# Patient Record
Sex: FEMALE | Race: WHITE | NOT HISPANIC OR LATINO | Employment: UNEMPLOYED | URBAN - METROPOLITAN AREA
[De-identification: names, ages, dates, MRNs, and addresses within clinical notes are randomized per-mention and may not be internally consistent; named-entity substitution may affect disease eponyms.]

---

## 2019-09-12 LAB
HBA1C MFR BLD: 5.2 % (ref 0–5.7)
HIV 1&2 EXT: NORMAL
HPV ABSTRACT: NORMAL
PAP-ABSTRACT: NORMAL

## 2019-10-30 ENCOUNTER — TRANSFERRED RECORDS (OUTPATIENT)
Dept: MULTI SPECIALTY CLINIC | Facility: CLINIC | Age: 59
End: 2019-10-30

## 2019-10-30 LAB
CHOLEST SERPL-MCNC: 220 MG/DL (ref 0–199)
CREAT SERPL-MCNC: 0.49 MG/DL (ref 0.55–1.02)
GFR SERPL CREATININE-BSD FRML MDRD: >60 ML/MIN/1.73M2
GLUCOSE SERPL-MCNC: 115 MG/DL (ref 70–100)
HDLC SERPL-MCNC: 55 MG/DL
LDLC SERPL CALC-MCNC: 103 MG/DL
NONHDLC SERPL-MCNC: 165 MG/DL
POTASSIUM SERPL-SCNC: 3.9 MMOL/L (ref 3.5–5.1)
TRIGL SERPL-MCNC: 535 MG/DL

## 2020-01-09 ENCOUNTER — DOCUMENTATION ONLY (OUTPATIENT)
Dept: FAMILY MEDICINE | Facility: CLINIC | Age: 60
End: 2020-01-09

## 2020-01-09 DIAGNOSIS — Z00.00 ROUTINE HISTORY AND PHYSICAL EXAMINATION OF ADULT: Primary | ICD-10-CM

## 2020-01-09 DIAGNOSIS — Z11.59 NEED FOR HEPATITIS C SCREENING TEST: ICD-10-CM

## 2020-01-09 DIAGNOSIS — Z13.220 SCREENING FOR CHOLESTEROL LEVEL: ICD-10-CM

## 2020-01-09 DIAGNOSIS — Z13.1 SCREENING FOR DIABETES MELLITUS: ICD-10-CM

## 2020-01-09 NOTE — PROGRESS NOTES
Patient has an up coming lab appointment on 1.17.2020. Please review and place future orders that may be needed.     Thank you  Betty Hein MLT ([facility])

## 2020-01-17 ENCOUNTER — DOCUMENTATION ONLY (OUTPATIENT)
Dept: LAB | Facility: CLINIC | Age: 60
End: 2020-01-17

## 2020-01-17 DIAGNOSIS — Z11.59 NEED FOR HEPATITIS C SCREENING TEST: ICD-10-CM

## 2020-01-17 DIAGNOSIS — Z13.220 SCREENING FOR CHOLESTEROL LEVEL: ICD-10-CM

## 2020-01-17 DIAGNOSIS — Z13.1 SCREENING FOR DIABETES MELLITUS: ICD-10-CM

## 2020-01-17 DIAGNOSIS — Z00.00 ROUTINE HISTORY AND PHYSICAL EXAMINATION OF ADULT: ICD-10-CM

## 2020-01-17 LAB
ALBUMIN SERPL-MCNC: 4 G/DL (ref 3.4–5)
ALP SERPL-CCNC: 62 U/L (ref 40–150)
ALT SERPL W P-5'-P-CCNC: 29 U/L (ref 0–50)
ANION GAP SERPL CALCULATED.3IONS-SCNC: 5 MMOL/L (ref 3–14)
AST SERPL W P-5'-P-CCNC: 21 U/L (ref 0–45)
BILIRUB SERPL-MCNC: 0.4 MG/DL (ref 0.2–1.3)
BUN SERPL-MCNC: 18 MG/DL (ref 7–30)
CALCIUM SERPL-MCNC: 9.8 MG/DL (ref 8.5–10.1)
CHLORIDE SERPL-SCNC: 103 MMOL/L (ref 94–109)
CHOLEST SERPL-MCNC: 239 MG/DL
CO2 SERPL-SCNC: 29 MMOL/L (ref 20–32)
CREAT SERPL-MCNC: 0.54 MG/DL (ref 0.52–1.04)
GFR SERPL CREATININE-BSD FRML MDRD: >90 ML/MIN/{1.73_M2}
GLUCOSE SERPL-MCNC: 110 MG/DL (ref 70–99)
HDLC SERPL-MCNC: 69 MG/DL
LDLC SERPL CALC-MCNC: 122 MG/DL
NONHDLC SERPL-MCNC: 170 MG/DL
POTASSIUM SERPL-SCNC: 4.7 MMOL/L (ref 3.4–5.3)
PROT SERPL-MCNC: 7.7 G/DL (ref 6.8–8.8)
SODIUM SERPL-SCNC: 137 MMOL/L (ref 133–144)
TRIGL SERPL-MCNC: 238 MG/DL

## 2020-01-17 PROCEDURE — 80061 LIPID PANEL: CPT | Performed by: FAMILY MEDICINE

## 2020-01-17 PROCEDURE — 80053 COMPREHEN METABOLIC PANEL: CPT | Performed by: FAMILY MEDICINE

## 2020-01-17 PROCEDURE — 36415 COLL VENOUS BLD VENIPUNCTURE: CPT | Performed by: FAMILY MEDICINE

## 2020-01-17 NOTE — PROGRESS NOTES
...Your patient was in for lab test today and she has refused the Hep C testing due to having it done at a different clinic. Thank you   Leda   @ [facility]

## 2020-01-21 ENCOUNTER — TELEPHONE (OUTPATIENT)
Dept: FAMILY MEDICINE | Facility: CLINIC | Age: 60
End: 2020-01-21

## 2020-01-29 ENCOUNTER — MYC MEDICAL ADVICE (OUTPATIENT)
Dept: FAMILY MEDICINE | Facility: CLINIC | Age: 60
End: 2020-01-29

## 2020-01-29 DIAGNOSIS — I10 ESSENTIAL HYPERTENSION: Primary | ICD-10-CM

## 2020-01-29 RX ORDER — AMLODIPINE BESYLATE 5 MG/1
5 TABLET ORAL DAILY
Qty: 30 TABLET | Refills: 0 | Status: SHIPPED | OUTPATIENT
Start: 2020-01-29 | End: 2020-02-14

## 2020-01-29 NOTE — TELEPHONE ENCOUNTER
The requested prescription(s) has/have been approved and has/have been sent to the patient's pharmacy electronically. This note was forwarded to the patient care pool to contact the patient and let them know that this has been taken care of.   Yonatan Rachel MD

## 2020-01-29 NOTE — TELEPHONE ENCOUNTER
Patient is going to start coming to [place] now.   Has pending appointment with Dr. Yonatan Rachel 2/14/2020 and needs her amlodipine refilled.   Patient verified that she is taking amlodipine 5 mg daily and this RN can see this dose in CareEverywhere.    Routing to Dr. Yonatan Rachel to advise if ok to order medication.    Noemy Reyez BSN, RN

## 2020-02-14 ENCOUNTER — OFFICE VISIT (OUTPATIENT)
Dept: FAMILY MEDICINE | Facility: CLINIC | Age: 60
End: 2020-02-14
Payer: COMMERCIAL

## 2020-02-14 VITALS
DIASTOLIC BLOOD PRESSURE: 74 MMHG | HEIGHT: 64 IN | BODY MASS INDEX: 29.53 KG/M2 | HEART RATE: 88 BPM | OXYGEN SATURATION: 96 % | TEMPERATURE: 98.3 F | SYSTOLIC BLOOD PRESSURE: 126 MMHG | WEIGHT: 173 LBS | RESPIRATION RATE: 20 BRPM

## 2020-02-14 DIAGNOSIS — Z29.9 PROPHYLACTIC MEASURE: ICD-10-CM

## 2020-02-14 DIAGNOSIS — E78.00 HYPERCHOLESTEROLEMIA: ICD-10-CM

## 2020-02-14 DIAGNOSIS — Z12.4 SCREENING FOR MALIGNANT NEOPLASM OF CERVIX: ICD-10-CM

## 2020-02-14 DIAGNOSIS — K21.9 GASTROESOPHAGEAL REFLUX DISEASE, ESOPHAGITIS PRESENCE NOT SPECIFIED: ICD-10-CM

## 2020-02-14 DIAGNOSIS — R73.02 IGT (IMPAIRED GLUCOSE TOLERANCE): ICD-10-CM

## 2020-02-14 DIAGNOSIS — Z23 NEED FOR VACCINATION: ICD-10-CM

## 2020-02-14 DIAGNOSIS — Z00.00 ROUTINE GENERAL MEDICAL EXAMINATION AT A HEALTH CARE FACILITY: Primary | ICD-10-CM

## 2020-02-14 DIAGNOSIS — Z12.11 SPECIAL SCREENING FOR MALIGNANT NEOPLASMS, COLON: ICD-10-CM

## 2020-02-14 DIAGNOSIS — I10 ESSENTIAL HYPERTENSION: ICD-10-CM

## 2020-02-14 PROCEDURE — 90471 IMMUNIZATION ADMIN: CPT | Performed by: FAMILY MEDICINE

## 2020-02-14 PROCEDURE — 90632 HEPA VACCINE ADULT IM: CPT | Performed by: FAMILY MEDICINE

## 2020-02-14 PROCEDURE — 99386 PREV VISIT NEW AGE 40-64: CPT | Mod: 25 | Performed by: FAMILY MEDICINE

## 2020-02-14 PROCEDURE — 90472 IMMUNIZATION ADMIN EACH ADD: CPT | Performed by: FAMILY MEDICINE

## 2020-02-14 PROCEDURE — 90732 PPSV23 VACC 2 YRS+ SUBQ/IM: CPT | Performed by: FAMILY MEDICINE

## 2020-02-14 RX ORDER — LISINOPRIL 40 MG/1
40 TABLET ORAL DAILY
COMMUNITY
End: 2020-02-14

## 2020-02-14 RX ORDER — MULTIVITAMIN WITH IRON
1 TABLET ORAL DAILY
COMMUNITY

## 2020-02-14 RX ORDER — LISINOPRIL 40 MG/1
40 TABLET ORAL DAILY
Qty: 90 TABLET | Refills: 3 | Status: SHIPPED | OUTPATIENT
Start: 2020-02-14 | End: 2021-01-26

## 2020-02-14 RX ORDER — AMLODIPINE BESYLATE 5 MG/1
5 TABLET ORAL DAILY
Qty: 90 TABLET | Refills: 3 | Status: SHIPPED | OUTPATIENT
Start: 2020-02-14 | End: 2021-01-26

## 2020-02-14 RX ORDER — ATORVASTATIN CALCIUM 40 MG/1
40 TABLET, FILM COATED ORAL DAILY
COMMUNITY
End: 2020-02-14

## 2020-02-14 RX ORDER — GEMFIBROZIL 600 MG/1
600 TABLET, FILM COATED ORAL
Qty: 180 TABLET | Refills: 3 | Status: SHIPPED | OUTPATIENT
Start: 2020-02-14 | End: 2021-01-26

## 2020-02-14 RX ORDER — ESCITALOPRAM OXALATE 20 MG/1
20 TABLET ORAL DAILY
COMMUNITY
End: 2020-06-01

## 2020-02-14 RX ORDER — ATORVASTATIN CALCIUM 40 MG/1
40 TABLET, FILM COATED ORAL AT BEDTIME
Qty: 90 TABLET | Refills: 3 | Status: SHIPPED | OUTPATIENT
Start: 2020-02-14 | End: 2021-01-04

## 2020-02-14 RX ORDER — GEMFIBROZIL 600 MG/1
600 TABLET, FILM COATED ORAL
COMMUNITY
End: 2020-02-14

## 2020-02-14 ASSESSMENT — ENCOUNTER SYMPTOMS
NERVOUS/ANXIOUS: 0
FEVER: 0
HEMATURIA: 0
DIZZINESS: 0
COUGH: 0
FREQUENCY: 0
CHILLS: 0
CONSTIPATION: 0
HEMATOCHEZIA: 0
DIARRHEA: 1
ABDOMINAL PAIN: 0
EYE PAIN: 0

## 2020-02-14 ASSESSMENT — PAIN SCALES - GENERAL: PAINLEVEL: NO PAIN (0)

## 2020-02-14 ASSESSMENT — MIFFLIN-ST. JEOR: SCORE: 1331.78

## 2020-02-14 NOTE — Clinical Note
Please abstract the following data from this visit with this patient into the appropriate field in Epic:Tests that can be patient reported without a hard copy:{Quality Abstract List (Optional):[id number]}Other Tests found in the patient's chart through Chart Review/Care Everywhere:Mammogram done by [facility] this date: 9/13/2019Note to Abstraction: If this section is blank, no results were found via Chart Review/Care Everywhere.

## 2020-02-14 NOTE — PATIENT INSTRUCTIONS
Preventive Health Recommendations  Female Ages 50 - 64    Yearly exam: See your health care provider every year in order to  o Review health changes.   o Discuss preventive care.    o Review your medicines if your doctor has prescribed any.      Get a Pap test every three years (unless you have an abnormal result and your provider advises testing more often).    If you get Pap tests with HPV test, you only need to test every 5 years, unless you have an abnormal result.     You do not need a Pap test if your uterus was removed (hysterectomy) and you have not had cancer.    You should be tested each year for STDs (sexually transmitted diseases) if you're at risk.     Have a mammogram every 1 to 2 years.    Have a colonoscopy at age 50, or have a yearly FIT test (stool test). These exams screen for colon cancer.      Have a cholesterol test every 5 years, or more often if advised.    Have a diabetes test (fasting glucose) every three years. If you are at risk for diabetes, you should have this test more often.     If you are at risk for osteoporosis (brittle bone disease), think about having a bone density scan (DEXA).    Shots: Get a flu shot each year. Get a tetanus shot every 10 years.    Nutrition:     Eat at least 5 servings of fruits and vegetables each day.    Eat whole-grain bread, whole-wheat pasta and brown rice instead of white grains and rice.    Get adequate Calcium and Vitamin D.     Lifestyle    Exercise at least 150 minutes a week (30 minutes a day, 5 days a week). This will help you control your weight and prevent disease.    Limit alcohol to one drink per day.    No smoking.     Wear sunscreen to prevent skin cancer.     See your dentist every six months for an exam and cleaning.    See your eye doctor every 1 to 2 years.        Patient Education     Prediabetes  You have been diagnosed with prediabetes. This means that the level of sugar (glucose) in your blood is too high. If you have prediabetes,  you are at risk for developing type 2 diabetes. Type 2 diabetes is diagnosed when the level of glucose in the blood reaches a certain high level. With prediabetes, it hasn t reached this point yet, but it is higher than normal. It is vital to make lifestyle changes to lower your blood sugar, improve your health, and prevent diabetes. This sheet will tell you more.      Why worry about prediabetes?  Prediabetes is a conditionhere the body s cells have trouble using glucose in the blood for energy. As a result, too much glucose stays in the blood and can affect how your heart and blood vessels work. Without changes in diet and lifestyle, the problem can get worse. Once you have type 2 diabetes, it is chronic (ongoing) and needs to be managed for the rest of your life. Diabetes can harm the body and your health by damaging organs, such as your eyes and kidneys. It makes you more likely to have heart disease. And it can damage nerves and blood vessels.  Who is a risk for prediabetes?  The exact cause of prediabetes is not clear. But certain risk factors make a person more likely to have it. These include:    A family history of type 2 diabetes    Being overweight    Being over age 45    Have hypertension or elevated cholesterol     Having had gestational diabetes    Not being physically active    Being ,  American, , , , or   Diagnosing prediabetes  Prediabetes may have no symptoms or you may have some of the symptoms of diabetes. The diagnosis is made with a blood test. You may have one or more of these blood tests:     Fasting glucose test. Blood is taken and tested after you have fasted (not eaten) for at least 8 hours. A normal test result is 99 milligrams per deciliter (mg/dL) or lower. Prediabetes is 100 mg/dL to 125 mg/dL. Diabetes is 126 mg/dL or higher.    Glucose tolerance test. Your blood sugar is measured before and after you drink a  very sugary liquid. A normal test result is 139 milligrams per deciliter (mg/dL) or lower. Prediabetes is 140 mg/dL to 199 mg/dL. Diabetes is 200 mg/dL or higher.    Hemoglobin A1c (HbA1c). Your HbA1c is normal if it is below 5.7%. Prediabetes is 5.7% to 6.4%. Diabetes is 6.5% or higher.   Treating prediabetes  The best way to treat prediabetes is to lose at least 5% to 7% of your current weight and be more physically active by getting at least 150 minutes a week of physical activity (at least 30 minutes daily.) When sitting for long periods of time, get up for short sessions of light activity every 30 minutes. These changes help the body s cells use blood sugar better. Even a small amount of weight loss can help. Work with your healthcare provider to make a plan to eat well and be more active. Keep in mind that small changes can add up. Other changes in your lifestyle (or even taking certain medicines, such as metformin) may make you less likely to develop diabetes. Your healthcare provider can talk with you about these. Stopping smoking will decrease your risk of developing diabetes, but you may gain some weight if you are not careful.  Follow-up  If it is untreated, prediabetes can turn into diabetes. This is a serious health condition. Take steps to stop this from happening. Follow the treatment plan you have been given. You may have your blood glucose tested again in about 12 to 18 months.  Symptoms of diabetes  Let your healthcare provider know if you have any of the following:    Always feel very tired    Feel very thirsty or hungry much of the time    Have to urinate often    Lose weight for no reason    Feel numbness or tingling in your fingers or toes    Have cuts or bruises that don t seem to heal    Have blurry vision  Date Last Reviewed: 5/1/2016 2000-2019 [employer]. [address] [facility], [place] [address]. All rights reserved. This information is not intended as a substitute for  professional medical care. Always follow your healthcare professional's instructions.

## 2020-02-14 NOTE — Clinical Note
Please abstract the following data from this visit with this patient into the appropriate field in Epic:Tests that can be patient reported without a hard copy:{Quality Abstract List (Optional):[id number]}Other Tests found in the patient's chart through Chart Review/Care Everywhere:[facility] this date: 9/12/2019 and Lipids done by [facility] on this date: 10/30/2019Note to Abstraction: If this section is blank, no results were found via Chart Review/Care Everywhere.

## 2020-02-14 NOTE — NURSING NOTE
"Chief Complaint   Patient presents with     Physical     Health Maintenance     order pended, PHQ2 Adv Dir, Mammo abstracted 9/13/2019 completed       Initial /79   Pulse 88   Temp 98.3  F (36.8  C) (Oral)   Resp 20   Ht 5' 3.5\" (1.613 m)   Wt 173 lb (78.5 kg)   SpO2 96%   BMI 30.16 kg/m   Estimated body mass index is 30.16 kg/m  as calculated from the following:    Height as of this encounter: 5' 3.5\" (1.613 m).    Weight as of this encounter: 173 lb (78.5 kg).  Medication Reconciliation: complete  Evie Rodriguez, GUILLE  "

## 2020-02-14 NOTE — PROGRESS NOTES
SUBJECTIVE:   CC: Leeann Johnson is an 59 year old woman who presents for preventive health visit.     Healthy Habits:     Getting at least 3 servings of Calcium per day:  Yes    Bi-annual eye exam:  NO    Dental care twice a year:  Yes    Sleep apnea or symptoms of sleep apnea:  None    Diet:  Regular (no restrictions)    Frequency of exercise:  None    Taking medications regularly:  Yes    Medication side effects:  None    PHQ-2 Total Score: 0    Additional concerns today:  No              Today's PHQ-2 Score:   PHQ-2 (  [employer]) 2020   Q1: Little interest or pleasure in doing things 0   Q2: Feeling down, depressed or hopeless 0   PHQ-2 Score 0   Q1: Little interest or pleasure in doing things Not at all   Q2: Feeling down, depressed or hopeless Not at all   PHQ-2 Score 0       Abuse: Current or Past(Physical, Sexual or Emotional)- No  Do you feel safe in your environment? Yes    Have you ever done Advance Care Planning? (For example, a Health Directive, POLST, or a discussion with a medical provider or your loved ones about your wishes): Yes, patient states has an Advance Care Planning document and will bring a copy to the clinic.    Social History     Tobacco Use     Smoking status: Former Smoker     Packs/day: 1.00     Years: 30.00     Pack years: 30.00     Types: Cigarettes     Start date: 5/3/1974     Last attempt to quit: 5/3/2005     Years since quittin.7     Smokeless tobacco: Never Used   Substance Use Topics     Alcohol use: Yes         Alcohol Use 2020   Prescreen: >3 drinks/day or >7 drinks/week? No       Reviewed orders with patient.  Reviewed health maintenance and updated orders accordingly -           Pertinent mammograms are reviewed under the imaging tab.  History of abnormal Pap smear: NO - age 30- 65 PAP every 3 years recommended     Reviewed and updated as needed this visit by clinical staff  Tobacco  Allergies  Meds  Med Hx  Surg Hx  Fam Hx  Soc Hx        Reviewed  "and updated as needed this visit by Provider  Tobacco            Review of Systems   Constitutional: Negative for chills and fever.   HENT: Negative for congestion and ear pain.    Eyes: Negative for pain.   Respiratory: Negative for cough.    Cardiovascular: Negative for chest pain.   Gastrointestinal: Positive for diarrhea. Negative for abdominal pain, constipation and hematochezia.   Genitourinary: Negative for frequency and hematuria.   Neurological: Negative for dizziness.   Psychiatric/Behavioral: The patient is not nervous/anxious.           OBJECTIVE:   /74   Pulse 88   Temp 98.3  F (36.8  C) (Oral)   Resp 20   Ht 5' 3.5\" (1.613 m)   Wt 173 lb (78.5 kg)   SpO2 96%   BMI 30.16 kg/m    Physical Exam      Diagnostic Test Results:  Labs reviewed in Epic    ASSESSMENT/PLAN:       ICD-10-CM    1. Routine general medical examination at a health care facility Z00.00    2. Screening for malignant neoplasm of cervix Z12.4    3. Special screening for malignant neoplasms, colon Z12.11 GASTROENTEROLOGY ADULT REF PROCEDURE ONLY   4. Prophylactic measure Z29.9 aspirin (ASA) 81 MG tablet   5. Essential hypertension I10 amLODIPine (NORVASC) 5 MG tablet     lisinopril (ZESTRIL) 40 MG tablet   6. Hypercholesterolemia E78.00 atorvastatin (LIPITOR) 40 MG tablet     gemfibrozil (LOPID) 600 MG tablet   7. Gastroesophageal reflux disease, esophagitis presence not specified K21.9 omeprazole (PRILOSEC) 20 MG DR capsule   8. IGT (impaired glucose tolerance) R73.02    9. Need for vaccination Z23        COUNSELING:  Reviewed preventive health counseling, as reflected in patient instructions       Regular exercise       Healthy diet/nutrition       Vision screening       Aspirin Prophylaxsis       Osteoporosis Prevention/Bone Health       Colon cancer screening       One time pneumovax for smokers    Estimated body mass index is 30.16 kg/m  as calculated from the following:    Height as of this encounter: 5' 3.5\" (1.613 " m).    Weight as of this encounter: 173 lb (78.5 kg).    Weight management plan: Discussed healthy diet and exercise guidelines     reports that she quit smoking about 14 years ago. Her smoking use included cigarettes. She started smoking about 45 years ago. She has a 30.00 pack-year smoking history. She has never used smokeless tobacco.      Counseling Resources:  ATP IV Guidelines  Pooled Cohorts Equation Calculator  Breast Cancer Risk Calculator  FRAX Risk Assessment  ICSI Preventive Guidelines  Dietary Guidelines for Americans, 2010  USDA's MyPlate  ASA Prophylaxis  Lung CA Screening    Yonatan Rachel MD  [facility]  --------------------------------------------------------------------------------------------------------------------------------------  SUBJECTIVE:  Leeann Johnson is a 59 year old female who presents to the clinic today for a routine physical exam.    The patient's last physical was in January 2019 at health [facility] in [place].      Cholesterol   Date Value Ref Range Status   01/17/2020 239 (H) <200 mg/dL Final     Comment:     Desirable:       <200 mg/dl     HDL Cholesterol   Date Value Ref Range Status   01/17/2020 69 >49 mg/dL Final     LDL Cholesterol Calculated   Date Value Ref Range Status   01/17/2020 122 (H) <100 mg/dL Final     Comment:     Above desirable:  100-129 mg/dl  Borderline High:  130-159 mg/dL  High:             160-189 mg/dL  Very high:       >189 mg/dl       Triglycerides   Date Value Ref Range Status   01/17/2020 238 (H) <150 mg/dL Final     Comment:     Borderline high:  150-199 mg/dl  High:             200-499 mg/dl  Very high:       >499 mg/dl  Fasting specimen       No results found for: CHOLHDLRATIO  The patient's last fasting lipid panel was done 3 weeks ago and the results are listed above.    The 10-year ASCVD risk score (Otoniel DC Jr., et al., 2013) is: 4.2%    Values used to calculate the score:      Age: 59 years      Sex: Female      Is  Non- : No      Diabetic: No      Tobacco smoker: No      Systolic Blood Pressure: 126 mmHg      Is BP treated: Yes      HDL Cholesterol: 69 mg/dL      Total Cholesterol: 239 mg/dL    Risk Enhancers:  Family history of premature ASCVD  LDL >159  Chronic kidney disease   Metabolic Syndrome  Premature menopause  Inflammatory conditions (RA, psoriasis, HIV)  SE  Ancestry  Triglycerides >174        The patient reports that she has been treated for high blood pressure.    The patient reports that she does not take a daily aspirin.        No results found for: HCVAB  The patient reports that she has been screened for Hepatitis C    (Screen all baby boomers once per CDC-- the generation born from 1945 through 1965 and per USPTF screen age 19 to 79 especially younger people who have used IV drugs)  She would not like to have an Hepatitis C test today    No results found for: HIAGAB  The patient reports that she has been screened for HIV   (Screen all 15 to 64 years old)  She would not like to have an HIV test today              Immunization History   Administered Date(s) Administered     Influenza (IIV3) PF 11/13/2013, 11/12/2014     Influenza Vaccine IM > 6 months Valent IIV4 10/06/2016, 11/16/2017, 09/12/2019     Influenza Vaccine Im 4yrs+ 4 Valent CCIIV4 10/02/2018     Influenza Vaccine, 6+MO IM (QUADRIVALENT W/PRESERVATIVES) 11/11/2015     TDAP Vaccine (Adacel) 12/19/2014     Td (Adult), Adsorbed 11/09/2004     Zoster vaccine, live 10/17/2012     The patient has not started the Gardasil vaccination series.  The patient's believes that her last tetanus shot was given 6 year(s) ago.   The patient believes that she has not had a Shingrix in the past  The patient believes that she has not had a PPSV23 in the past.  The patient believes that she has not had a PCV13 in the past.  The patient believes that she has had a seasonal flu vaccination this fall or winter.  The patient would like to have a  PPSV23       No results found for this or any previous visit.]   The patient denies a family history of colon cancer.  The patient reports that she has had a colonoscopy. Her  last colonoscopy was in 2010 and she  report that is was normal. The patient was told to have this repeated in 10 years.    The patient reports a family history diabetes in her mother.    The patient reports that she does performs a self breast exam occasionally.  The patient denies a family history of breast cancer.  The patient does not want to have a mammogram done. She had one 9-  The patient does not want to have a Pap smear done as she just had one done in September 2019  She reports that she has not had an abnormal pap smear.    She had her last period about age 49.  The patient is not interested in hormone replacement therapy.  The patient reports that she eats or drinks 3 servings of dairy products per day. She does not take a calcium supplement at all..  The patient reports that she has not had a bone density scan.     (screen women 65+ or 50+ with risk factors)     The patient reports that she has dental appointments approximately every 6 months.  The patient reports that she  has an eye examination approximately every 2 year(s).        Do you currently smoke? No, quit 15 years ago  How many years have you smoked? 30  How many packs per day did you smoke on average? 1 ppd  (if more than 30 pack year history and the patient is age 55-80 consider ordering an annual low dose radiation lung CT to screen for cancer)  (Do not order if patient has quit more than 15 years ago or has a health condition that limits life expectancy or could not tolerate curative lung surgery)  Are you interested having a lung CT to screen for lung cancer? N/A            Past Medical History:   Diagnosis Date     Hypertension 2018       Past Surgical History:   Procedure Laterality Date     COLONOSCOPY  4-2010     GYN SURGERY  5-1997    Tubular        Family History   Problem Relation Age of Onset     Diabetes Mother      Hypertension Mother      Hyperlipidemia Mother      Depression Mother      Coronary Artery Disease Father      Hypertension Father      Hyperlipidemia Father      Coronary Artery Disease Maternal Grandmother      Coronary Artery Disease Maternal Grandfather        Social History     Socioeconomic History     Marital status:      Spouse name: Not on file     Number of children: Not on file     Years of education: Not on file     Highest education level: Not on file   Occupational History     Not on file   Social Needs     Financial resource strain: Not on file     Food insecurity:     Worry: Not on file     Inability: Not on file     Transportation needs:     Medical: Not on file     Non-medical: Not on file   Tobacco Use     Smoking status: Former Smoker     Packs/day: 1.00     Years: 30.00     Pack years: 30.00     Types: Cigarettes     Start date: 5/3/1974     Last attempt to quit: 5/3/2005     Years since quittin.7     Smokeless tobacco: Never Used   Substance and Sexual Activity     Alcohol use: Yes     Drug use: Not Currently     Types: Cocaine, Marijuana, Methamphetamines     Sexual activity: Not Currently     Partners: Male     Birth control/protection: Post-menopausal   Lifestyle     Physical activity:     Days per week: Not on file     Minutes per session: Not on file     Stress: Not on file   Relationships     Social connections:     Talks on phone: Not on file     Gets together: Not on file     Attends [employer] service: Not on file     Active member of club or organization: Not on file     Attends meetings of clubs or organizations: Not on file     Relationship status: Not on file     Intimate partner violence:     Fear of current or ex partner: Not on file     Emotionally abused: Not on file     Physically abused: Not on file     Forced sexual activity: Not on file   Other Topics Concern     Parent/sibling w/ CABG,  "MI or angioplasty before 65F 55M? Yes     Comment: Father bypass   Social History Narrative     Not on file       Current Outpatient Medications   Medication Sig Dispense Refill     amLODIPine (NORVASC) 5 MG tablet Take 1 tablet (5 mg) by mouth daily 30 tablet 0     atorvastatin (LIPITOR) 40 MG tablet Take 40 mg by mouth daily       escitalopram (LEXAPRO) 20 MG tablet Take 20 mg by mouth daily       gemfibrozil (LOPID) 600 MG tablet Take 600 mg by mouth 2 times daily (before meals)       lisinopril (ZESTRIL) 40 MG tablet Take 40 mg by mouth daily       magnesium 250 MG tablet Take 1 tablet by mouth daily       Omega-3 Fatty Acids (FISH OIL OMEGA-3 PO)        omeprazole (PRILOSEC) 20 MG DR capsule Take 20 mg by mouth daily       vitamin B-Complex Take 1 tablet by mouth daily           PHYSICAL EXAMINATION:  Blood pressure 126/74, pulse 88, temperature 98.3  F (36.8  C), temperature source Oral, resp. rate 20, height 5' 3.5\" (1.613 m), weight 173 lb (78.5 kg), SpO2 96 %.  General appearance - healthy, alert and no distress  Skin - Skin color, texture, turgor normal. No rashes or lesions.  Head - Normocephalic. No masses, lesions, tenderness or abnormalities  Eyes - conjunctivae/corneas clear. PERRL, EOM's intact. Fundi benign  Ears - External ears normal. Canals clear. TM's normal.  Nose/Sinuses - Nares normal. Septum midline. Mucosa normal. No drainage or sinus tenderness.  Oropharynx - Lips, mucosa, and tongue normal. Teeth and gums normal.  Neck - Neck supple. No adenopathy. Thyroid symmetric, normal size,  Lungs - Percussion normal. Good diaphragmatic excursion. Lungs clear  Heart - PMI normal. No lifts, heaves, or thrills. RRR. No murmurs, clicks gallops or rub  Breasts - Breasts normal to inspection and palpation. Axillae negative  Abdomen - Abdomen soft, non-tender. BS normal. No masses, organomegaly  Extremities - Extremities normal. No deformities, edema, or skin discoloration.  Musculoskeletal - Spine ROM " normal. Muscular strength intact.  Peripheral pulses - radial=4/4, femoral=4/4, popliteal=4/4, dorsalis pedis=4/4,  Neuro - Gait normal. Reflexes normal and symmetric. Sensation grossly WNL.        Orders Only on 01/17/2020   Component Date Value Ref Range Status     Sodium 01/17/2020 137  133 - 144 mmol/L Final     Potassium 01/17/2020 4.7  3.4 - 5.3 mmol/L Final     Chloride 01/17/2020 103  94 - 109 mmol/L Final     Carbon Dioxide 01/17/2020 29  20 - 32 mmol/L Final     Anion Gap 01/17/2020 5  3 - 14 mmol/L Final     Glucose 01/17/2020 110* 70 - 99 mg/dL Final    Fasting specimen     Urea Nitrogen 01/17/2020 18  7 - 30 mg/dL Final     Creatinine 01/17/2020 0.54  0.52 - 1.04 mg/dL Final     GFR Estimate 01/17/2020 >90  >60 mL/min/[1.73_m2] Final    Comment: Non  GFR Calc  Starting 12/18/2018, serum creatinine based estimated GFR (eGFR) will be   calculated using the Chronic Kidney Disease Epidemiology Collaboration   (CKD-EPI) equation.       GFR Estimate If Black 01/17/2020 >90  >60 mL/min/[1.73_m2] Final    Comment:  GFR Calc  Starting 12/18/2018, serum creatinine based estimated GFR (eGFR) will be   calculated using the Chronic Kidney Disease Epidemiology Collaboration   (CKD-EPI) equation.       Calcium 01/17/2020 9.8  8.5 - 10.1 mg/dL Final     Bilirubin Total 01/17/2020 0.4  0.2 - 1.3 mg/dL Final     Albumin 01/17/2020 4.0  3.4 - 5.0 g/dL Final     Protein Total 01/17/2020 7.7  6.8 - 8.8 g/dL Final     Alkaline Phosphatase 01/17/2020 62  40 - 150 U/L Final     ALT 01/17/2020 29  0 - 50 U/L Final     AST 01/17/2020 21  0 - 45 U/L Final     Cholesterol 01/17/2020 239* <200 mg/dL Final    Desirable:       <200 mg/dl     Triglycerides 01/17/2020 238* <150 mg/dL Final    Comment: Borderline high:  150-199 mg/dl  High:             200-499 mg/dl  Very high:       >499 mg/dl  Fasting specimen       HDL Cholesterol 01/17/2020 69  >49 mg/dL Final     LDL Cholesterol Calculated 01/17/2020  122* <100 mg/dL Final    Comment: Above desirable:  100-129 mg/dl  Borderline High:  130-159 mg/dL  High:             160-189 mg/dL  Very high:       >189 mg/dl       Non HDL Cholesterol 01/17/2020 170* <130 mg/dL Final    Comment: Above Desirable:  130-159 mg/dl  Borderline high:  160-189 mg/dl  High:             190-219 mg/dl  Very high:       >219 mg/dl         ASSESSMENT:    ICD-10-CM    1. Routine general medical examination at a health care facility Z00.00    2. Screening for malignant neoplasm of cervix Z12.4        Well-Adult Physical Exam.  Health Maintenance Due   Topic Date Due     PREVENTIVE CARE VISIT  1960     HEPATITIS C SCREENING  1960     ADVANCE CARE PLANNING  1960     MAMMO SCREENING  1960     COLONOSCOPY  08/12/1969     HIV SCREENING  08/12/1974     PAP  08/12/1980     ZOSTER IMMUNIZATION (2 of 3) 12/12/2012     PHQ-2  01/01/2020     Health Maintenance   Topic Date Due     PREVENTIVE CARE VISIT  1960     HEPATITIS C SCREENING  1960     ADVANCE CARE PLANNING  1960     MAMMO SCREENING  1960     COLONOSCOPY  08/12/1969     HIV SCREENING  08/12/1974     PAP  08/12/1980     ZOSTER IMMUNIZATION (2 of 3) 12/12/2012     PHQ-2  01/01/2020     DTAP/TDAP/TD IMMUNIZATION (2 - Td) 12/19/2024     LIPID  01/17/2025     INFLUENZA VACCINE  Completed     IPV IMMUNIZATION  Aged Out     MENINGITIS IMMUNIZATION  Aged Out         HEALTH CARE MAINTENENCE: The recommended screening tests and vaccinatons for this patient have been discussed as above.  The appropriate tests and vaccinations  have been ordered or declined by the patient. Please see the orders in EPIC.The patient specifically declines: Shingrix today but she will come to the pharmacy after her [place] trip    Immunization Status:  up to date and documented except for Hep A and Shingrix and PPSV23    Patient Active Problem List   Diagnosis     IGT (impaired glucose tolerance)     Essential hypertension      Hypercholesterolemia     Gastroesophageal reflux disease, esophagitis presence not specified        ATP III Guidelines  ICSI Preventive Guidelines    PLAN:  Risk of gemfibrozil and statin discussed. She has been on these for many years without problems and she specifically denies muscle aches  I recommended starting to take a daily aspirin ( mg)  HIV testing was discussed but the pt declined  Shingrix recommended  PPSV23 recommended  Hep A vaccine recommended  Colonoscopy recommended  Breast self exam demonstrated and recommended  Pap smear was not recommended per the ACOG guidelines  Discussed calcium intake, vitamins and supplements. Recommended 1200 mg of calcium daily  Bone density scan (DEXA) recommended in 4 years   Weight loss through diet and exercise was recommended  Sunscreen use was recommended especially in the area of tatoos  Refills on chronic medication given  Recommended dental exams every 6 months  Recommended eye exam every 1-2 years  Follow up in 1 year for the next preventative medical visit      Osteoporosis TX indications:  Post menopausal women with a hip or vertebral fracture  Any T score less than or equal to -2.5  Any T score between -1.0 and -2.5 and a 10 year hip fracture probability > or = to 3%  Any T score between -1.0 and -2.5 and a 10 year probability or a major osteoporosis-related fracture  > or = 20% based on FRAX score  [email or web address]            The patients chronic medication was filled for 12 months.    Body mass index is 30.16 kg/m .

## 2020-02-14 NOTE — NURSING NOTE
Prior to immunization administration, verified patients identity using patient s name and date of birth. Please see Immunization Activity for additional information.     Screening Questionnaire for Adult Immunization    Are you sick today?   No   Do you have allergies to medications, food, a vaccine component or latex?   No   Have you ever had a serious reaction after receiving a vaccination?   No   Do you have a long-term health problem with heart, lung, kidney, or metabolic disease (e.g., diabetes), asthma, a blood disorder, no spleen, complement component deficiency, a cochlear implant, or a spinal fluid leak?  Are you on long-term aspirin therapy?   No   Do you have cancer, leukemia, HIV/AIDS, or any other immune system problem?   No   Do you have a parent, brother, or sister with an immune system problem?   No   In the past 3 months, have you taken medications that affect  your immune system, such as prednisone, other steroids, or anticancer drugs; drugs for the treatment of rheumatoid arthritis, Crohn s disease, or psoriasis; or have you had radiation treatments?   No   Have you had a seizure, or a brain or other nervous system problem?   No   During the past year, have you received a transfusion of blood or blood    products, or been given immune (gamma) globulin or antiviral drug?   No   For women: Are you pregnant or is there a chance you could become       pregnant during the next month?   No   Have you received any vaccinations in the past 4 weeks?   No     Immunization questionnaire answers were all negative.        Per orders of Dr. Rachel, injection of Pneumo 23, Hep A given by Evie Rodriguez CMA. Patient instructed to remain in clinic for 15 minutes afterwards, and to report any adverse reaction to me immediately.       Screening performed by Evie Rodriguez CMA on 2/14/2020 at 3:07 PM.

## 2020-02-16 ENCOUNTER — HEALTH MAINTENANCE LETTER (OUTPATIENT)
Age: 60
End: 2020-02-16

## 2020-02-17 ASSESSMENT — ANXIETY QUESTIONNAIRES
3. WORRYING TOO MUCH ABOUT DIFFERENT THINGS: NOT AT ALL
6. BECOMING EASILY ANNOYED OR IRRITABLE: NOT AT ALL
IF YOU CHECKED OFF ANY PROBLEMS ON THIS QUESTIONNAIRE, HOW DIFFICULT HAVE THESE PROBLEMS MADE IT FOR YOU TO DO YOUR WORK, TAKE CARE OF THINGS AT HOME, OR GET ALONG WITH OTHER PEOPLE: NOT DIFFICULT AT ALL
7. FEELING AFRAID AS IF SOMETHING AWFUL MIGHT HAPPEN: NOT AT ALL
5. BEING SO RESTLESS THAT IT IS HARD TO SIT STILL: NOT AT ALL
GAD7 TOTAL SCORE: 0
1. FEELING NERVOUS, ANXIOUS, OR ON EDGE: NOT AT ALL
2. NOT BEING ABLE TO STOP OR CONTROL WORRYING: NOT AT ALL

## 2020-02-17 ASSESSMENT — PATIENT HEALTH QUESTIONNAIRE - PHQ9
SUM OF ALL RESPONSES TO PHQ QUESTIONS 1-9: 0
5. POOR APPETITE OR OVEREATING: NOT AT ALL

## 2020-02-18 ASSESSMENT — ANXIETY QUESTIONNAIRES: GAD7 TOTAL SCORE: 0

## 2020-02-21 ENCOUNTER — HOSPITAL ENCOUNTER (OUTPATIENT)
Facility: AMBULATORY SURGERY CENTER | Age: 60
End: 2020-02-21
Attending: SURGERY | Admitting: SURGERY
Payer: COMMERCIAL

## 2020-06-01 ENCOUNTER — MYC MEDICAL ADVICE (OUTPATIENT)
Dept: FAMILY MEDICINE | Facility: CLINIC | Age: 60
End: 2020-06-01

## 2020-06-01 DIAGNOSIS — F41.9 ANXIETY: Primary | ICD-10-CM

## 2020-06-01 RX ORDER — ESCITALOPRAM OXALATE 20 MG/1
20 TABLET ORAL DAILY
Qty: 90 TABLET | Refills: 1 | Status: SHIPPED | OUTPATIENT
Start: 2020-06-01 | End: 2020-12-28

## 2020-06-01 NOTE — TELEPHONE ENCOUNTER
Patient has been taking this from her old clinic/ provider.   RN pended.     Routing to provider to advise.  Noemy Reyez BSN, RN

## 2020-06-23 DIAGNOSIS — Z11.59 ENCOUNTER FOR SCREENING FOR OTHER VIRAL DISEASES: Primary | ICD-10-CM

## 2020-07-14 RX ORDER — SODIUM, POTASSIUM,MAG SULFATES 17.5-3.13G
1 SOLUTION, RECONSTITUTED, ORAL ORAL SEE ADMIN INSTRUCTIONS
Qty: 2 BOTTLE | Refills: 0 | Status: SHIPPED | OUTPATIENT
Start: 2020-07-14 | End: 2022-05-09

## 2020-07-14 RX ORDER — BISACODYL 5 MG
5 TABLET, DELAYED RELEASE (ENTERIC COATED) ORAL SEE ADMIN INSTRUCTIONS
Qty: 1 TABLET | Refills: 0 | Status: SHIPPED | OUTPATIENT
Start: 2020-07-14 | End: 2022-05-09

## 2020-07-18 DIAGNOSIS — Z11.59 ENCOUNTER FOR SCREENING FOR OTHER VIRAL DISEASES: ICD-10-CM

## 2020-07-18 PROCEDURE — U0003 INFECTIOUS AGENT DETECTION BY NUCLEIC ACID (DNA OR RNA); SEVERE ACUTE RESPIRATORY SYNDROME CORONAVIRUS 2 (SARS-COV-2) (CORONAVIRUS DISEASE [COVID-19]), AMPLIFIED PROBE TECHNIQUE, MAKING USE OF HIGH THROUGHPUT TECHNOLOGIES AS DESCRIBED BY CMS-2020-01-R: HCPCS | Performed by: SURGERY

## 2020-07-19 LAB
SARS-COV-2 RNA SPEC QL NAA+PROBE: NOT DETECTED
SPECIMEN SOURCE: NORMAL

## 2020-07-21 ENCOUNTER — HOSPITAL ENCOUNTER (OUTPATIENT)
Facility: AMBULATORY SURGERY CENTER | Age: 60
Discharge: HOME | End: 2020-07-21
Attending: SURGERY | Admitting: SURGERY
Payer: COMMERCIAL

## 2020-07-21 VITALS
DIASTOLIC BLOOD PRESSURE: 63 MMHG | HEART RATE: 87 BPM | OXYGEN SATURATION: 93 % | SYSTOLIC BLOOD PRESSURE: 110 MMHG | TEMPERATURE: 97.4 F | RESPIRATION RATE: 16 BRPM

## 2020-07-21 DIAGNOSIS — Z12.11 SCREEN FOR COLON CANCER: Primary | ICD-10-CM

## 2020-07-21 LAB — COLONOSCOPY: NORMAL

## 2020-07-21 PROCEDURE — 88305 TISSUE EXAM BY PATHOLOGIST: CPT | Performed by: SURGERY

## 2020-07-21 PROCEDURE — 45381 COLONOSCOPY SUBMUCOUS NJX: CPT

## 2020-07-21 PROCEDURE — G8907 PT DOC NO EVENTS ON DISCHARG: HCPCS

## 2020-07-21 PROCEDURE — 45385 COLONOSCOPY W/LESION REMOVAL: CPT | Mod: PT | Performed by: SURGERY

## 2020-07-21 PROCEDURE — 45381 COLONOSCOPY SUBMUCOUS NJX: CPT | Mod: PT | Performed by: SURGERY

## 2020-07-21 PROCEDURE — 45385 COLONOSCOPY W/LESION REMOVAL: CPT

## 2020-07-21 PROCEDURE — 99152 MOD SED SAME PHYS/QHP 5/>YRS: CPT | Mod: 59 | Performed by: SURGERY

## 2020-07-21 PROCEDURE — G8918 PT W/O PREOP ORDER IV AB PRO: HCPCS

## 2020-07-21 RX ORDER — ONDANSETRON 2 MG/ML
4 INJECTION INTRAMUSCULAR; INTRAVENOUS EVERY 6 HOURS PRN
Status: DISCONTINUED | OUTPATIENT
Start: 2020-07-21 | End: 2020-07-22 | Stop reason: HOSPADM

## 2020-07-21 RX ORDER — FENTANYL CITRATE 50 UG/ML
INJECTION, SOLUTION INTRAMUSCULAR; INTRAVENOUS PRN
Status: DISCONTINUED | OUTPATIENT
Start: 2020-07-21 | End: 2020-07-21 | Stop reason: HOSPADM

## 2020-07-21 RX ORDER — METOCLOPRAMIDE HYDROCHLORIDE 5 MG/ML
10 INJECTION INTRAMUSCULAR; INTRAVENOUS EVERY 6 HOURS PRN
Status: DISCONTINUED | OUTPATIENT
Start: 2020-07-21 | End: 2020-07-22 | Stop reason: HOSPADM

## 2020-07-21 RX ORDER — NALOXONE HYDROCHLORIDE 0.4 MG/ML
.1-.4 INJECTION, SOLUTION INTRAMUSCULAR; INTRAVENOUS; SUBCUTANEOUS
Status: DISCONTINUED | OUTPATIENT
Start: 2020-07-21 | End: 2020-07-22 | Stop reason: HOSPADM

## 2020-07-21 RX ORDER — FLUMAZENIL 0.1 MG/ML
0.2 INJECTION, SOLUTION INTRAVENOUS
Status: SHIPPED | OUTPATIENT
Start: 2020-07-21 | End: 2020-07-21

## 2020-07-21 RX ORDER — METOCLOPRAMIDE 10 MG/1
10 TABLET ORAL EVERY 6 HOURS PRN
Status: DISCONTINUED | OUTPATIENT
Start: 2020-07-21 | End: 2020-07-22 | Stop reason: HOSPADM

## 2020-07-21 RX ORDER — PROCHLORPERAZINE MALEATE 10 MG
10 TABLET ORAL EVERY 6 HOURS PRN
Status: DISCONTINUED | OUTPATIENT
Start: 2020-07-21 | End: 2020-07-22 | Stop reason: HOSPADM

## 2020-07-21 RX ORDER — ONDANSETRON 4 MG/1
4 TABLET, ORALLY DISINTEGRATING ORAL EVERY 6 HOURS PRN
Status: DISCONTINUED | OUTPATIENT
Start: 2020-07-21 | End: 2020-07-22 | Stop reason: HOSPADM

## 2020-07-21 RX ORDER — LIDOCAINE 40 MG/G
CREAM TOPICAL
Status: DISCONTINUED | OUTPATIENT
Start: 2020-07-21 | End: 2020-07-22 | Stop reason: HOSPADM

## 2020-07-21 RX ORDER — ONDANSETRON 2 MG/ML
4 INJECTION INTRAMUSCULAR; INTRAVENOUS
Status: DISCONTINUED | OUTPATIENT
Start: 2020-07-21 | End: 2020-07-22 | Stop reason: HOSPADM

## 2020-07-23 LAB — COPATH REPORT: NORMAL

## 2020-12-28 ENCOUNTER — MYC MEDICAL ADVICE (OUTPATIENT)
Dept: FAMILY MEDICINE | Facility: CLINIC | Age: 60
End: 2020-12-28

## 2020-12-28 DIAGNOSIS — F41.9 ANXIETY: ICD-10-CM

## 2020-12-28 DIAGNOSIS — K21.9 GASTROESOPHAGEAL REFLUX DISEASE: Primary | ICD-10-CM

## 2020-12-28 RX ORDER — ESCITALOPRAM OXALATE 20 MG/1
20 TABLET ORAL DAILY
Qty: 90 TABLET | Refills: 0 | Status: SHIPPED | OUTPATIENT
Start: 2020-12-28 | End: 2021-04-02

## 2021-01-24 DIAGNOSIS — I10 ESSENTIAL HYPERTENSION: ICD-10-CM

## 2021-01-24 DIAGNOSIS — E78.00 HYPERCHOLESTEROLEMIA: ICD-10-CM

## 2021-01-26 RX ORDER — AMLODIPINE BESYLATE 5 MG/1
TABLET ORAL
Qty: 90 TABLET | Refills: 0 | Status: SHIPPED | OUTPATIENT
Start: 2021-01-26 | End: 2021-04-26

## 2021-01-26 RX ORDER — GEMFIBROZIL 600 MG/1
TABLET, FILM COATED ORAL
Qty: 180 TABLET | Refills: 0 | Status: SHIPPED | OUTPATIENT
Start: 2021-01-26 | End: 2021-04-26

## 2021-01-26 RX ORDER — LISINOPRIL 40 MG/1
TABLET ORAL
Qty: 90 TABLET | Refills: 0 | Status: SHIPPED | OUTPATIENT
Start: 2021-01-26 | End: 2021-04-26

## 2021-01-26 NOTE — TELEPHONE ENCOUNTER
Routing refill request to provider for review/approval because:  Labs not current:    Creatinine   Date Value Ref Range Status   01/17/2020 0.54 0.52 - 1.04 mg/dL Final     Potassium   Date Value Ref Range Status   01/17/2020 4.7 3.4 - 5.3 mmol/L Final     Lab Results   Component Value Date    CHOL 239 01/17/2020     Lab Results   Component Value Date    HDL 69 01/17/2020     Lab Results   Component Value Date     01/17/2020     Lab Results   Component Value Date    TRIG 238 01/17/2020     No results found for: STEPHON Foster RN

## 2021-03-28 DIAGNOSIS — K21.9 GASTROESOPHAGEAL REFLUX DISEASE: ICD-10-CM

## 2021-03-29 NOTE — TELEPHONE ENCOUNTER
Prescription approved per [facility] Refill Protocol.  APPT NEEDED FOR FURTHER REFILLS  Mai refill given per RN protocol.   Due for office visit  Pharmacy note to inform pt of office visit needed for continued medication use  Tricia Foster RN

## 2021-03-31 DIAGNOSIS — E78.00 HYPERCHOLESTEROLEMIA: ICD-10-CM

## 2021-04-01 RX ORDER — ATORVASTATIN CALCIUM 40 MG/1
TABLET, FILM COATED ORAL
Qty: 90 TABLET | Refills: 0 | Status: SHIPPED | OUTPATIENT
Start: 2021-04-01 | End: 2021-07-05

## 2021-04-02 DIAGNOSIS — F41.9 ANXIETY: ICD-10-CM

## 2021-04-02 RX ORDER — ESCITALOPRAM OXALATE 20 MG/1
TABLET ORAL
Qty: 90 TABLET | Refills: 0 | Status: SHIPPED | OUTPATIENT
Start: 2021-04-02 | End: 2021-06-10

## 2021-04-02 NOTE — TELEPHONE ENCOUNTER
"Pt has upcoming appointment made.    Requested Prescriptions   Pending Prescriptions Disp Refills    escitalopram (LEXAPRO) 20 MG tablet [Pharmacy Med Name: ESCITALOPRAM TABS 20MG] 90 tablet 3     Sig: TAKE 1 TABLET DAILY (APPOINTMENT NEEDED FOR FURTHER REFILLS)       SSRIs Protocol Failed - 4/2/2021 12:25 PM        Failed - Recent (12 mo) or future (30 days) visit within the authorizing provider's specialty     Patient has had an office visit with the authorizing provider or a provider within the authorizing providers department within the previous 12 mos or has a future within next 30 days. See \"Patient Info\" tab in inbasket, or \"Choose Columns\" in Meds & Orders section of the refill encounter.              Passed - Medication is active on med list        Passed - Patient is age 18 or older        Passed - No active pregnancy on record        Passed - No positive pregnancy test in last 12 months             "

## 2021-04-04 ENCOUNTER — HEALTH MAINTENANCE LETTER (OUTPATIENT)
Age: 61
End: 2021-04-04

## 2021-04-05 ENCOUNTER — IMMUNIZATION (OUTPATIENT)
Dept: PEDIATRICS | Facility: CLINIC | Age: 61
End: 2021-04-05
Payer: COMMERCIAL

## 2021-04-05 PROCEDURE — 0001A PR COVID VAC PFIZER DIL RECON 30 MCG/0.3 ML IM: CPT

## 2021-04-05 PROCEDURE — 91300 PR COVID VAC PFIZER DIL RECON 30 MCG/0.3 ML IM: CPT

## 2021-04-23 DIAGNOSIS — E78.00 HYPERCHOLESTEROLEMIA: ICD-10-CM

## 2021-04-23 DIAGNOSIS — I10 ESSENTIAL HYPERTENSION: ICD-10-CM

## 2021-04-25 ASSESSMENT — ENCOUNTER SYMPTOMS
MYALGIAS: 0
SORE THROAT: 0
CONSTIPATION: 0
COUGH: 0
JOINT SWELLING: 0
FEVER: 0
WEAKNESS: 0
EYE PAIN: 0
NAUSEA: 0
DIZZINESS: 0
PALPITATIONS: 0
CHILLS: 0
FREQUENCY: 1
HEARTBURN: 0
NERVOUS/ANXIOUS: 0
SHORTNESS OF BREATH: 0
ARTHRALGIAS: 0
DIARRHEA: 1
HEMATURIA: 0
BREAST MASS: 0
HEMATOCHEZIA: 1
ABDOMINAL PAIN: 0
DYSURIA: 0
HEADACHES: 0
PARESTHESIAS: 0

## 2021-04-26 ENCOUNTER — IMMUNIZATION (OUTPATIENT)
Dept: PEDIATRICS | Facility: CLINIC | Age: 61
End: 2021-04-26
Attending: INTERNAL MEDICINE
Payer: COMMERCIAL

## 2021-04-26 PROCEDURE — 0002A PR COVID VAC PFIZER DIL RECON 30 MCG/0.3 ML IM: CPT

## 2021-04-26 PROCEDURE — 91300 PR COVID VAC PFIZER DIL RECON 30 MCG/0.3 ML IM: CPT

## 2021-04-26 RX ORDER — AMLODIPINE BESYLATE 5 MG/1
TABLET ORAL
Qty: 90 TABLET | Refills: 3 | Status: SHIPPED | OUTPATIENT
Start: 2021-04-26 | End: 2021-04-28

## 2021-04-26 RX ORDER — LISINOPRIL 40 MG/1
TABLET ORAL
Qty: 90 TABLET | Refills: 3 | Status: SHIPPED | OUTPATIENT
Start: 2021-04-26 | End: 2022-04-21

## 2021-04-26 RX ORDER — GEMFIBROZIL 600 MG/1
TABLET, FILM COATED ORAL
Qty: 180 TABLET | Refills: 3 | Status: SHIPPED | OUTPATIENT
Start: 2021-04-26 | End: 2022-04-21

## 2021-04-26 NOTE — TELEPHONE ENCOUNTER
Next 5 appointments (look out 90 days)    Apr 28, 2021 12:00 PM  PHYSICAL with Yonatan Rachel MD  [facility] [address] [facility] [address] [phone]        Routing refill request to provider for review/approval because:  Labs not current:  Lipids, bmp  Ruth Montgomery BSN, RN

## 2021-04-27 NOTE — PATIENT INSTRUCTIONS
Preventive Health Recommendations  Female Ages 50 - 64    Yearly exam: See your health care provider every year in order to  o Review health changes.   o Discuss preventive care.    o Review your medicines if your doctor has prescribed any.      Get a Pap test every three years (unless you have an abnormal result and your provider advises testing more often).    If you get Pap tests with HPV test, you only need to test every 5 years, unless you have an abnormal result.     You do not need a Pap test if your uterus was removed (hysterectomy) and you have not had cancer.    You should be tested each year for STDs (sexually transmitted diseases) if you're at risk.     Have a mammogram every 1 to 2 years.    Have a colonoscopy at age 50, or have a yearly FIT test (stool test). These exams screen for colon cancer.      Have a cholesterol test every 5 years, or more often if advised.    Have a diabetes test (fasting glucose) every three years. If you are at risk for diabetes, you should have this test more often.     If you are at risk for osteoporosis (brittle bone disease), think about having a bone density scan (DEXA).    Shots: Get a flu shot each year. Get a tetanus shot every 10 years.    Nutrition:     Eat at least 5 servings of fruits and vegetables each day.    Eat whole-grain bread, whole-wheat pasta and brown rice instead of white grains and rice.    Get adequate Calcium and Vitamin D.     Lifestyle    Exercise at least 150 minutes a week (30 minutes a day, 5 days a week). This will help you control your weight and prevent disease.    Limit alcohol to one drink per day.    No smoking.     Wear sunscreen to prevent skin cancer.     See your dentist every six months for an exam and cleaning.    See your eye doctor every 1 to 2 years.    Preventive Health Recommendations  Female Ages 50 - 64    Yearly exam: See your health care provider every year in order to  o Review health changes.   o Discuss preventive  care.    o Review your medicines if your doctor has prescribed any.      Get a Pap test every three years (unless you have an abnormal result and your provider advises testing more often).    If you get Pap tests with HPV test, you only need to test every 5 years, unless you have an abnormal result.     You do not need a Pap test if your uterus was removed (hysterectomy) and you have not had cancer.    You should be tested each year for STDs (sexually transmitted diseases) if you're at risk.     Have a mammogram every 1 to 2 years.    Have a colonoscopy at age 50, or have a yearly FIT test (stool test). These exams screen for colon cancer.      Have a cholesterol test every 5 years, or more often if advised.    Have a diabetes test (fasting glucose) every three years. If you are at risk for diabetes, you should have this test more often.     If you are at risk for osteoporosis (brittle bone disease), think about having a bone density scan (DEXA).    Shots: Get a flu shot each year. Get a tetanus shot every 10 years.    Nutrition:     Eat at least 5 servings of fruits and vegetables each day.    Eat whole-grain bread, whole-wheat pasta and brown rice instead of white grains and rice.    Get adequate Calcium and Vitamin D.     Lifestyle    Exercise at least 150 minutes a week (30 minutes a day, 5 days a week). This will help you control your weight and prevent disease.    Limit alcohol to one drink per day.    No smoking.     Wear sunscreen to prevent skin cancer.     See your dentist every six months for an exam and cleaning.    See your eye doctor every 1 to 2 years.    Patient Education     Preventing Osteoporosis: Meeting Your Calcium Needs    Your body needs calcium to build and repair bones. But it can't make calcium on its own. That's why it's important to eat calcium-rich foods. Some foods are naturally rich in calcium. Others have calcium added (fortified). It's best to get calcium from the foods you eat. But  if you can't get enough, you may want to take calcium supplements. To meet your daily calcium needs, try the foods listed below.  Dairy Fish & beans Other sources   Source   Calcium (mg) per serving   Source   Calcium (mg) per serving   Source   Calcium (mg) per serving   Low-fat yogurt, plain   415 mg/8 oz.   Sardines, Atlantic, canned, with bones   351 mg/3 oz.   Oatmeal, instant, fortified   215 mg/1 cup   Nonfat milk   302 mg/1 cup   [place], sockeye, canned, with bones   239 mg/3 oz.   Tofu made with calcium sulfate   204 mg/3 oz.   Low-fat milk   297 mg/1 cup   Soybeans, fresh, boiled   131 mg/1/2 cup   Collards   179 mg/1/2 cup   Swiss cheese   272 mg/1 oz.   White beans, cooked   81 mg/1/2 cup   English muffin, whole wheat   175 mg/1 muffin   Cheddar cheese   205 mg/1 oz.   Navy beans, cooked   79 mg/1/2 cup   Kale   90 mg/1/2 cup   Ice cream strawberry   79 mg/1/2 cup           Orange, navel   56 mg/1 medium   Note: Calcium levels may vary depending on brand and size.  Daily calcium needs  14 to 18 years old: 1,300 mg  19 to 30 years old: 1,000 mg  31 to 50 years old: 1,000 mg  51 to 70 years old, women: 1,200 mg  51 to 70 years old, men: 1,000 mg  Pregnant or nursin to 18 years old: 1,300 mg, 19 to 50 years old: 1,000 mg  Older than 70 (women and men): 1,200 mg   [employer] last reviewed this educational content on 2018-2021 [employer]. All rights reserved. This information is not intended as a substitute for professional medical care. Always follow your healthcare professional's instructions.           Patient Education     Preventing Osteoporosis: Meeting Your Calcium Needs    Your body needs calcium to build and repair bones. But it can't make calcium on its own. That's why it's important to eat calcium-rich foods. Some foods are naturally rich in calcium. Others have calcium added (fortified). It's best to get calcium from the foods you eat. But if you can't get enough, you  may want to take calcium supplements. To meet your daily calcium needs, try the foods listed below.  Dairy Fish & beans Other sources   Source   Calcium (mg) per serving   Source   Calcium (mg) per serving   Source   Calcium (mg) per serving   Low-fat yogurt, plain   415 mg/8 oz.   Sardines, Atlantic, canned, with bones   351 mg/3 oz.   Oatmeal, instant, fortified   215 mg/1 cup   Nonfat milk   302 mg/1 cup   [place], sockeye, canned, with bones   239 mg/3 oz.   Tofu made with calcium sulfate   204 mg/3 oz.   Low-fat milk   297 mg/1 cup   Soybeans, fresh, boiled   131 mg/1/2 cup   Collards   179 mg/1/2 cup   Swiss cheese   272 mg/1 oz.   White beans, cooked   81 mg/1/2 cup   English muffin, whole wheat   175 mg/1 muffin   Cheddar cheese   205 mg/1 oz.   Navy beans, cooked   79 mg/1/2 cup   Kale   90 mg/1/2 cup   Ice cream strawberry   79 mg/1/2 cup           Orange, navel   56 mg/1 medium   Note: Calcium levels may vary depending on brand and size.  Daily calcium needs  14 to 18 years old: 1,300 mg  19 to 30 years old: 1,000 mg  31 to 50 years old: 1,000 mg  51 to 70 years old, women: 1,200 mg  51 to 70 years old, men: 1,000 mg  Pregnant or nursin to 18 years old: 1,300 mg, 19 to 50 years old: 1,000 mg  Older than 70 (women and men): 1,200 mg   [employer] last reviewed this educational content on 2018-2021 [employer]. All rights reserved. This information is not intended as a substitute for professional medical care. Always follow your healthcare professional's instructions.         Please schedule and eye exam with you eye care provider and continue to do so every 2 year(s).   According to your chart you are due for that now.    Please call our [facility] Scheduling Line at [phone] to schedule your:  Mammogram            Patient Education     Lowering Your Blood Pressure with DASH  What is the DASH eating plan?  DASH (Dietary Approaches to Stop Hypertension) can help you  prevent or lower high blood pressure. This eating plan provides the nutrients that help lower blood pressure: potassium, magnesium, calcium, protein and fiber.   If not controlled, high blood pressure may lead to heart disease, stroke or blindness.  This eating plan is rich in:     Fruits and vegetables    Whole grains    Fat-free or low-fat milk products    Fish and poultry (chicken, turkey, etc.)    Beans, seeds and nuts.  This eating plan is low in:     Salt and sodium    Sugar, sweets and drinks with sugar    Red meat, saturated fat and trans fat.  Lifestyle changes  Besides a healthy eating plan, other steps are needed to help control high blood pressure.     Stay at a healthy weight.    Be active for at least 30 minutes on most days.    If you drink alcohol, have no more than two drinks per day (for men) or one per day (for women).    If you take blood pressure medicine, take it as directed.  Losing weight with DASH  You can lose weight by eating fewer calories. It is best to take off pounds slowly over time. Talk to a dietitian about the best way to do this.  Staying active   To shed pounds, combine DASH with daily exercise. Start with a 15-minute walk each day. Slowly increase the time until you reach a total of 30 minutes on most days. To avoid weight gain, try for 60 minutes each day. Check with your doctor before starting any exercise program.  Tips for less sodium    Avoid processed foods. These may include baked goods, cereals, soy sauce and even antacids.    Cook with less salt. Don't bring the saltshaker to the table.    Season food with herbs, spices, lemon, lime, vinegar, wine and salt-free seasoning blends.    Use low-sodium canned vegetables or fruits.  Tips to ease the change  Take a week or two to slowly make changes to your diet.    Add a serving of vegetables at lunch one day. The next day, add a serving at dinner as well.    Add fruit to one meal or eat it as a snack.    Work up to three  servings of fat-free and low-fat milk products each day.    If you eat large portions of meat, cut back by a third at each meal. The goal is to eat 6 oz (ounces) of meat or less per day.    Serve brown rice and whole-wheat bread and pasta.    Try casseroles and stir-ross dishes that have less meat and more vegetables, grains or cooked dry beans.    Serve two or more meatless meals each week.    For snacks and desserts, eat foods low in fat, sodium and sugar, such as:  ? Unsalted rice cakes, nuts or seeds  ? Fresh fruits, raw vegetables and raisins  ? Fat-free, low-fat or frozen yogurt  ? Popcorn with no salt or butter.    If you have trouble digesting milk products, try lactose-free milk or take lactase pills.    If you have a nut allergy, eat seeds, beans, lentils or split peas.    Fruits, vegetables and whole grain foods are high in fiber. To avoid bloating and diarrhea (loose, watery stools), increase these foods over several weeks.  The DASH eating plan  Use this chart to plan your meals. Or take it with you when you shop for food.   Food Group Servings per Day  Serving Size Examples    1,600 Calories 2,000 Calories 2,600 Calories      Grains  (whole wheat) 6 6 to 8 10 to 11   1 slice bread    1 oz dry cereal      cup cooked rice, pasta or cereal Whole-wheat bread and rolls, whole-wheat pasta, English muffin, cruz bread, bagel, cereals, grits, oatmeal, brown rice, unsalted pretzels and popcorn   Vegetables  3 to 4 4 to 5 5 to 6   1 cup raw leafy vegetables      cup cut-up raw or cooked vegetable      cup vegetable juice Broccoli, carrots, collards, green beans, green peas, kale, lima beans, potatoes, spinach, squash, sweet potatoes, tomatoes   Fruits 4 4 to 5 5 to 6   1 medium fruit      cup dried fruit      cup fresh, frozen, or canned fruit      cup fruit juice Apples, apricots, bananas, dates, grapes, oranges, grapefruit, mangoes, melons, peaches, pineapples, raisins, strawberries, tangerines   Fat-free or    low-fat milk and milk products 2 to 3 2 to 3 3   1 cup milk or yogurt    1   oz cheese Fat-free or low-fat (1%) milk, buttermilk, cheese, regular or frozen yogurt   Lean meats, poultry and fish 3 to 6 6 or less 6   1 oz cooked meats, poultry (chicken, turkey) or fish    1 egg    2 egg whites Lean meats (trim away any fat, then broil, roast or poach); remove skin from poultry. Eggs are high in cholesterol, so limit egg yolks to four or less per week.   Nuts, seeds   and legumes 3 per week 4 to 5 per week 1 per day   ? cup (1   oz) nuts    2 Tbsp peanut butter    2 Tbsp (   oz) seeds      cup cooked legumes (dry beans and peas) Almonds, hazelnuts, mixed nuts, peanuts, walnuts, sunflower seeds, peanut butter, kidney beans, lentils, split peas   Fats and oils 2 2 to 3 3   1 tsp soft margarine    1 tsp vegetable oil    1 Tbsp abdi    2 Tbsp salad dressing Soft margarine, vegetable oil (such as canola, corn, olive or safflower), low-fat mayonnaise, light salad dressing   Sweets and   added sugars 0 5 or less per week 2 or less per day   1 Tbsp sugar, jelly or jam      cup sorbet or gelatin    1 cup lemonade Fruit-flavored gelatin, fruit punch, hard candy, jelly, maple syrup, sorbets and ices   A sample meal plan  This sample menu gives two sodium levels. Start with 2,300 mg of sodium (about 1 teaspoon of table salt per day). Then, try to lower your intake to 1,500 mg a day. Talk to your doctor or dietitian about how to do this.   These samples are for people who eat 2,000 calories per day. The less salt you eat, the more you may lower your blood pressure.   Menu for 2,300 mg sodium per day   Breakfast:   cup instant oatmeal, 1 mini whole-wheat bagel, 1 tablespoon peanut butter, 1 medium banana, 1 cup (8 ounces) low-fat milk.   Lunch: 1 cup cantaloupe chunks, 1 cup apple juice and one chicken breast sandwich that includes:    Two slices (3 ounces) chicken breast, no skin    Two slices whole-wheat bread    1 slice (    "ounce) reduced-fat cheddar cheese    One leaf ashlie lettuce    Two slices tomato    1 tablespoon low-fat mayonnaise.  Dinner: 1 cup cooked spaghetti,   cup low-salt vegetarian spaghetti sauce, 3 tablespoons Parmesan cheese;   cup corn (from frozen);   cup canned pears in juice; one spinach salad that includes:    1 cup fresh spinach leaves      cup fresh carrots, grated      cup fresh mushrooms, sliced    1 tablespoon vinegar and oil dressing.  Snacks:? cup unsalted almonds;   cup dried apricots; 1 cup fat-free fruit yogurt, no sugar added.  Reducing to 1,500 mg sodium per day  Use the same menu plan, but:    For breakfast, replace instant oatmeal with regular oatmeal and 1 teaspoon cinnamon.    For lunch, replace cheddar cheese with low-sodium Swiss cheese.  Resources on diet and health  National [employer] [place]  [facility]  [address] [address]   [place] [address]   Phone: [phone]   TTY: [phone]   www.nhlbi.nih.gov   \"Aim for a Healthy Weight\"   www.nhlbi.nih.gov/health/public/heart/obesity/lose_wt/index.htm  \"Dietary Guidelines for Americans 2005\"   and \"A Healthier You\"   www.healthierus.gov/dietaryguidelines  For informational purposes only. Not to replace the advice of your health care provider. Adapted from \"Your Guide to Lowering Blood Pressure with DASH,\" by the National Heart, Lung [employer] [place],   NIH Publication No. , revised April 2006. Available at www.nhlbi.nih.gov/health/public/heart/hbp/dash/index.htm. Published by [employer]. [employer] [id number] - REV 09/15.  For informational purposes only. Not to replace the advice of your health care provider.  Copyright   2018 [employer]. All rights reserved.           "

## 2021-04-28 ENCOUNTER — OFFICE VISIT (OUTPATIENT)
Dept: FAMILY MEDICINE | Facility: CLINIC | Age: 61
End: 2021-04-28
Payer: COMMERCIAL

## 2021-04-28 VITALS
OXYGEN SATURATION: 97 % | HEART RATE: 80 BPM | SYSTOLIC BLOOD PRESSURE: 142 MMHG | DIASTOLIC BLOOD PRESSURE: 85 MMHG | BODY MASS INDEX: 31.07 KG/M2 | WEIGHT: 182 LBS | TEMPERATURE: 97.7 F | HEIGHT: 64 IN

## 2021-04-28 DIAGNOSIS — K21.9 GASTROESOPHAGEAL REFLUX DISEASE, UNSPECIFIED WHETHER ESOPHAGITIS PRESENT: ICD-10-CM

## 2021-04-28 DIAGNOSIS — E78.00 ELEVATED LDL CHOLESTEROL LEVEL: ICD-10-CM

## 2021-04-28 DIAGNOSIS — Z12.31 ENCOUNTER FOR SCREENING MAMMOGRAM FOR BREAST CANCER: ICD-10-CM

## 2021-04-28 DIAGNOSIS — Z00.00 ROUTINE GENERAL MEDICAL EXAMINATION AT A HEALTH CARE FACILITY: Primary | ICD-10-CM

## 2021-04-28 DIAGNOSIS — R73.02 IGT (IMPAIRED GLUCOSE TOLERANCE): ICD-10-CM

## 2021-04-28 DIAGNOSIS — I10 ESSENTIAL HYPERTENSION: ICD-10-CM

## 2021-04-28 LAB
ALBUMIN SERPL-MCNC: 4.3 G/DL (ref 3.4–5)
ALP SERPL-CCNC: 74 U/L (ref 40–150)
ALT SERPL W P-5'-P-CCNC: 33 U/L (ref 0–50)
ANION GAP SERPL CALCULATED.3IONS-SCNC: 7 MMOL/L (ref 3–14)
AST SERPL W P-5'-P-CCNC: 27 U/L (ref 0–45)
BILIRUB SERPL-MCNC: 0.4 MG/DL (ref 0.2–1.3)
BUN SERPL-MCNC: 16 MG/DL (ref 7–30)
CALCIUM SERPL-MCNC: 9.7 MG/DL (ref 8.5–10.1)
CHLORIDE SERPL-SCNC: 105 MMOL/L (ref 94–109)
CHOLEST SERPL-MCNC: 282 MG/DL
CO2 SERPL-SCNC: 26 MMOL/L (ref 20–32)
CREAT SERPL-MCNC: 0.72 MG/DL (ref 0.52–1.04)
GFR SERPL CREATININE-BSD FRML MDRD: >90 ML/MIN/{1.73_M2}
GLUCOSE SERPL-MCNC: 107 MG/DL (ref 70–99)
HDLC SERPL-MCNC: 80 MG/DL
LDLC SERPL CALC-MCNC: 134 MG/DL
NONHDLC SERPL-MCNC: 202 MG/DL
POTASSIUM SERPL-SCNC: 4.2 MMOL/L (ref 3.4–5.3)
PROT SERPL-MCNC: 7.9 G/DL (ref 6.8–8.8)
SODIUM SERPL-SCNC: 138 MMOL/L (ref 133–144)
TRIGL SERPL-MCNC: 341 MG/DL

## 2021-04-28 PROCEDURE — 80061 LIPID PANEL: CPT | Performed by: FAMILY MEDICINE

## 2021-04-28 PROCEDURE — 80053 COMPREHEN METABOLIC PANEL: CPT | Performed by: FAMILY MEDICINE

## 2021-04-28 PROCEDURE — 36415 COLL VENOUS BLD VENIPUNCTURE: CPT | Performed by: FAMILY MEDICINE

## 2021-04-28 PROCEDURE — 99396 PREV VISIT EST AGE 40-64: CPT | Performed by: FAMILY MEDICINE

## 2021-04-28 RX ORDER — AMLODIPINE BESYLATE 10 MG/1
10 TABLET ORAL DAILY
Qty: 90 TABLET | Refills: 1 | Status: SHIPPED | OUTPATIENT
Start: 2021-04-28 | End: 2021-09-20

## 2021-04-28 ASSESSMENT — ENCOUNTER SYMPTOMS
FREQUENCY: 1
WEAKNESS: 0
HEARTBURN: 0
CHILLS: 0
DIARRHEA: 1
CONSTIPATION: 0
HEMATURIA: 0
BREAST MASS: 0
PARESTHESIAS: 0
HEADACHES: 0
SORE THROAT: 0
MYALGIAS: 0
EYE PAIN: 0
HEMATOCHEZIA: 1
ARTHRALGIAS: 0
PALPITATIONS: 0
ABDOMINAL PAIN: 0
COUGH: 0
FEVER: 0
NAUSEA: 0
SHORTNESS OF BREATH: 0
JOINT SWELLING: 0
DYSURIA: 0
NERVOUS/ANXIOUS: 0
DIZZINESS: 0

## 2021-04-28 ASSESSMENT — MIFFLIN-ST. JEOR: SCORE: 1367.61

## 2021-04-28 NOTE — PROGRESS NOTES
SUBJECTIVE:   CC: Leeann Johnson is an 60 year old woman who presents for preventive health visit.       Patient has been advised of split billing requirements and indicates understanding: Yes  Healthy Habits:     Getting at least 3 servings of Calcium per day:  Yes    Bi-annual eye exam:  NO    Dental care twice a year:  NO    Sleep apnea or symptoms of sleep apnea:  None    Diet:  Regular (no restrictions)    Frequency of exercise:  2-3 days/week    Duration of exercise:  Other    Taking medications regularly:  Yes    Medication side effects:  None, No muscle aches and No significant flushing    PHQ-2 Total Score: 0    Additional concerns today:  No              Today's PHQ-2 Score:   PHQ-2 ( 1999 Pfizer) 4/25/2021   Q1: Little interest or pleasure in doing things 0   Q2: Feeling down, depressed or hopeless 0   PHQ-2 Score 0   Q1: Little interest or pleasure in doing things Not at all   Q2: Feeling down, depressed or hopeless Not at all   PHQ-2 Score 0       Abuse: Current or Past (Physical, Sexual or Emotional) - No  Do you feel safe in your environment? Yes        Social History     Tobacco Use     Smoking status: Former Smoker     Packs/day: 1.00     Years: 30.00     Pack years: 30.00     Types: Cigarettes     Start date: 5/3/1974     Quit date: 5/3/2005     Years since quitting: 15.9     Smokeless tobacco: Never Used   Substance Use Topics     Alcohol use: Yes     Comment: 3 drinks /day         Alcohol Use 4/25/2021   Prescreen: >3 drinks/day or >7 drinks/week? Yes   AUDIT SCORE  10       Reviewed orders with patient.  Reviewed health maintenance and updated orders accordingly -       Breast Cancer Screening:    Breast CA Risk Assessment (FHS-7) 4/25/2021   Do you have a family history of breast, colon, or ovarian cancer? No / Unknown           Pertinent mammograms are reviewed under the imaging tab.    History of abnormal Pap smear:      Reviewed and updated as needed this visit by clinical staff  Tobacco  " Allergies    Med Hx  Surg Hx  Fam Hx  Soc Hx        Reviewed and updated as needed this visit by Provider  Tobacco                   Review of Systems   Constitutional: Negative for chills and fever.   HENT: Negative for congestion, ear pain, hearing loss and sore throat.    Eyes: Negative for pain and visual disturbance.   Respiratory: Negative for cough and shortness of breath.    Cardiovascular: Negative for chest pain, palpitations and peripheral edema.   Gastrointestinal: Positive for diarrhea and hematochezia. Negative for abdominal pain, constipation, heartburn and nausea.   Breasts:  Negative for tenderness, breast mass and discharge.   Genitourinary: Positive for frequency. Negative for dysuria, genital sores, hematuria, pelvic pain, urgency, vaginal bleeding and vaginal discharge.   Musculoskeletal: Negative for arthralgias, joint swelling and myalgias.   Skin: Negative for rash.   Neurological: Negative for dizziness, weakness, headaches and paresthesias.   Psychiatric/Behavioral: Negative for mood changes. The patient is not nervous/anxious.           OBJECTIVE:   BP (!) 142/85   Pulse 80   Temp 97.7  F (36.5  C) (Tympanic)   Ht 1.613 m (5' 3.5\")   Wt 82.6 kg (182 lb)   SpO2 97%   BMI 31.73 kg/m    Physical Exam          ASSESSMENT/PLAN:       ICD-10-CM    1. Routine general medical examination at a health care facility  Z00.00 Comprehensive metabolic panel   2. IGT (impaired glucose tolerance)  R73.02 REVIEW OF HEALTH MAINTENANCE PROTOCOL ORDERS     Comprehensive metabolic panel   3. Gastroesophageal reflux disease, unspecified whether esophagitis present  K21.9 REVIEW OF HEALTH MAINTENANCE PROTOCOL ORDERS     Comprehensive metabolic panel   4. Essential hypertension  I10 REVIEW OF HEALTH MAINTENANCE PROTOCOL ORDERS     Comprehensive metabolic panel     amLODIPine (NORVASC) 10 MG tablet   5. Elevated LDL cholesterol level  E78.00 REVIEW OF HEALTH MAINTENANCE PROTOCOL ORDERS     Lipid panel " "reflex to direct LDL Fasting     Comprehensive metabolic panel   6. Encounter for screening mammogram for breast cancer  Z12.31 MA Screening Digital Bilateral       Patient has been advised of split billing requirements and indicates understanding: Yes  COUNSELING:  Reviewed preventive health counseling, as reflected in patient instructions       Regular exercise       Healthy diet/nutrition       Vision screening       Aspirin prophylaxis       Osteoporosis prevention/bone health       Colon cancer screening       Consider Hep C screening for all patients one time for ages 18-79 years       HIV screeninx in teen years, 1x in adult years, and at intervals if high risk       One time pneumovax for smokers    Estimated body mass index is 31.73 kg/m  as calculated from the following:    Height as of this encounter: 1.613 m (5' 3.5\").    Weight as of this encounter: 82.6 kg (182 lb).    Weight management plan: Discussed healthy diet and exercise guidelines    She reports that she quit smoking about 15 years ago. Her smoking use included cigarettes. She started smoking about 47 years ago. She has a 30.00 pack-year smoking history. She has never used smokeless tobacco.      Counseling Resources:  ATP IV Guidelines  Pooled Cohorts Equation Calculator  Breast Cancer Risk Calculator  BRCA-Related Cancer Risk Assessment: FHS-7 Tool  FRAX Risk Assessment  ICSI Preventive Guidelines  Dietary Guidelines for Americans,   [employer]'s MyPlate  ASA Prophylaxis  Lung CA Screening    Yonatan Rachel MD  [facility]  --------------------------------------------------------------------------------------------------------------------------------------    SUBJECTIVE:  Leeann Johnson is a 60 year old female who presents to the clinic today for a routine physical exam.    The patient's last physical was 20    Cholesterol   Date Value Ref Range Status   2020 239 (H) <200 mg/dL Final     Comment:     " Desirable:       <200 mg/dl   10/30/2019 220 (H) 0 - 199 mg/dL Final     HDL Cholesterol   Date Value Ref Range Status   01/17/2020 69 >49 mg/dL Final   10/30/2019 55 >=40 mg/dL Final     LDL Cholesterol Calculated   Date Value Ref Range Status   01/17/2020 122 (H) <100 mg/dL Final     Comment:     Above desirable:  100-129 mg/dl  Borderline High:  130-159 mg/dL  High:             160-189 mg/dL  Very high:       >189 mg/dl       LDL Cholesterol Direct   Date Value Ref Range Status   10/30/2019 103 <=130 mg/dL Final     Triglycerides   Date Value Ref Range Status   01/17/2020 238 (H) <150 mg/dL Final     Comment:     Borderline high:  150-199 mg/dl  High:             200-499 mg/dl  Very high:       >499 mg/dl  Fasting specimen     10/30/2019 535 (H) <=149 mg/dL Final     No results found for: CHOLHDLRATIO  The patient's last fasting lipid panel was done 1 years ago and the results are listed above.    The 10-year ASCVD risk score ([place] TRISTIAN Jr., et al., 2013) is: 6%    Values used to calculate the score:      Age: 60 years      Sex: Female      Is Non- : No      Diabetic: No      Tobacco smoker: No      Systolic Blood Pressure: 142 mmHg      Is BP treated: Yes      HDL Cholesterol: 69 mg/dL      Total Cholesterol: 239 mg/dL          The patient reports that she has been treated for high blood pressure.    The patient reports that she does take a daily aspirin.        No results found for: HCVAB   The patient reports that she has been screened for Hepatitis C    (Screen all baby boomers once per CDC-- the generation born from 1945 through 1965 and per USPTF screen age 19 to 79 especially younger people who have used IV drugs)  She would not like to have an Hepatitis C test today    No results found for: HIAGAB  The patient reports that she has not been screened for HIV   (Screen all 15 to 64 years old)  She would not like to have an HIV test today        Immunization History   Administered  Date(s) Administered     COVID-19,PF,Pfizer 04/05/2021, 04/26/2021     HepA-Adult 02/14/2020     Influenza (IIV3) PF 11/13/2013, 11/12/2014     Influenza Quad, Recombinant, p-free (RIV4) 09/25/2020     Influenza Vaccine IM > 6 months Valent IIV4 10/06/2016, 11/16/2017, 09/12/2019     Influenza Vaccine Im 4yrs+ 4 Valent CCIIV4 10/02/2018     Influenza Vaccine, 6+MO IM (QUADRIVALENT W/PRESERVATIVES) 11/11/2015     Pneumococcal 23 valent 02/14/2020     TDAP Vaccine (Adacel) 12/19/2014     Td (Adult), Adsorbed 11/09/2004     Zoster vaccine recombinant adjuvanted (SHINGRIX) 09/25/2020, 01/15/2021     Zoster vaccine, live 10/17/2012     The patient has not started the Gardasil vaccination series.  The patient's believes that her last tetanus shot was given 7 year(s) ago.   The patient believes that she has had a Shingrix in the past  The patient believes that she has had a PPSV23 in the past.  The patient believes that she has not had a PCV13 in the past.  The patient believes that she has had a seasonal flu vaccination this fall or winter.  The patient would like to have a Hepatitis A      Results for orders placed or performed during the hospital encounter of 07/21/20   COLONOSCOPY   Result Value Ref Range    COLONOSCOPY       [facility]  Endoscopy Department-[place]  _______________________________________________________________________________  Patient Name: Leeann Johnson           Procedure Date: 7/21/2020 10:14 AM  MRN: [id number]                       YOB: 1960  Admit Type: Outpatient                Age: 59  Gender: Female                        Note Status: Finalized  Attending MD: Yimi Nettles ,   Instrument Name: [id number]  _______________________________________________________________________________     Procedure:                Colonoscopy  Indications:              Screening for colorectal malignant neoplasm  Providers:                Yimi Bianchi  Jagruti Nettles MD:             Yonatan Rachel MD  Medicines:                Midazolam 3 mg IV, Fentanyl 150 micrograms IV  Complications:            No immediate complications.  _______________________________________________________________________________  Procedure:                 Pre-Anesthesia Assessment:                            - Prior to the procedure, a History and Physical                             was performed, and patient medications and                             allergies were reviewed. The risks and benefits of                             the procedure and the sedation options and risks                             were discussed with the patient. All questions were                             answered and informed consent was obtained. Patient                             identification and proposed procedure were verified                             by the physician in the pre-procedure area. Mental                             Status Examination: alert and oriented. Airway                             Examination: normal oropharyngeal airway and neck                             mobility. Respiratory Examination: clear to                             auscultation. CV Examination: normal. Prophylactic                             Ant ibiotics: The patient does not require                             prophylactic antibiotics. Prior Anticoagulants: The                             patient has taken no previous anticoagulant or                             antiplatelet agents except for aspirin. ASA Grade                             Assessment: II - A patient with mild systemic                             disease. After reviewing the risks and benefits,                             the patient was deemed in satisfactory condition to                             undergo the procedure. The anesthesia plan was to                             use moderate sedation / analgesia (conscious                              sedation). This assessment was completed before the                             administration of sedation at 10:20 AM.                            After obtaining informed consent, the colonoscope                             was passed under direct vision. Throughout the                             procedure, the pat ient's blood pressure, pulse, and                             oxygen saturations were monitored continuously. The                             was introduced through the anus and advanced to the                             terminal ileum, with identification of the                             appendiceal orifice and IC valve. The colonoscopy                             was performed without difficulty. The patient                             tolerated the procedure well. The quality of the                             bowel preparation was good.                                                                                   Findings:       The perianal and digital rectal examinations were normal. Pertinent        negatives include normal sphincter tone.       Three sessile polyps were found in the rectum and ascending colon (x2).        The polyps were 1 to 2 mm in size. These polyps were removed with a lift        and cut technique using a cold snare. Resection and retrieval were         complete.       The terminal ileum appeared normal.       The exam was otherwise without abnormality on direct and retroflexion        views.                                                                                   Moderate Sedation:       Moderate (conscious) sedation was administered by the endoscopy nurse        and supervised by the endoscopist. The patient's oxygen saturation,        heart rate, blood pressure and response to care were monitored. Total        physician intraservice time was 20 minutes.  Impression:               - Three 1 to 2 mm polyps in the rectum and in the                              ascending colon, removed using lift and cut and a                             cold snare. Resected and retrieved.                            - The examined portion of the ileum was normal.                            - The examination was otherwise normal on direct                             and retroflexion views.  Recommendation:           - Patient has a contact nu mber available for                             emergencies. The signs and symptoms of potential                             delayed complications were discussed with the                             patient. Return to normal activities tomorrow.                             Written discharge instructions were provided to the                             patient.                            - Resume previous diet.                            - Continue present medications.                            - Await pathology results.                            - I will send you and your primary care physician a                             letter once I get the pathology results back, this                             may take a week or more to arrive                            - If the pathology report reveals adenomatous                             tissue, then repeat the colonoscopy for                             surveillance of multiple adenomas in 3 - 5 years.                            - Return  to primary care physician PRN.                                                                                     Yimi Nettles MD  ______________________  Yimi Nettles,   7/21/2020 10:57:58 AM  I was physically present for the entire viewing portion of the exam.Yimi Nettles  Number of Addenda: 0    Note Initiated On: 7/21/2020 10:14 AM  Scope In:  Scope Out:     ]   The patient denies a family history of colon cancer.  The patient reports that she has had a colonoscopy. Her  last colonoscopy was in 2020 and she  report that is  was abnormal. The patient was told to have this repeated in 3 years.    The patient reports a family history diabetes.        The patient reports that she does not performs a self breast exam monthly.  The patient denies a family history of breast cancer.  The patient does want to have a mammogram done.  The patient does not want to have a Pap smear done.  She reports that she has not had an abnormal pap smear.  The patient is not interested in hormone replacement therapy.  The patient reports that she eats or drinks 2 servings of dairy products per day. She does not take a calcium supplement at all..  The patient reports that she has not had a bone density scan.   (screen women 65+ or 50+ with risk factors)     The patient reports that she has dental appointments approximately every 12 months.  The patient reports that she  has an eye examination approximately every 5 year(s).        Do you currently smoke? No quit 17 years ago  How many years have you smoked?   How many packs per day did you smoke on average? N/A  (if more than 30 pack year history and the patient is age 55-80 consider ordering an annual low dose radiation lung CT to screen for cancer)  (Do not order if patient has quit more than 15 years ago or has a health condition that limits life expectancy or could not tolerate curative lung surgery)  Are you interested having a lung CT to screen for lung cancer? N/A          Past Medical History:   Diagnosis Date     Hypertension        Past Surgical History:   Procedure Laterality Date     BUNIONECTOMY Left 2009      SECTION       COLONOSCOPY       COLONOSCOPY WITH CO2 INSUFFLATION N/A 2020    Procedure: COLONOSCOPY, WITH CO2 INSUFFLATION;  Surgeon: Yimi Nettles MD;  Location: [facility] OR     GYN SURGERY      Tubular     TUBAL LIGATION         Family History   Problem Relation Age of Onset     Diabetes Mother      Hypertension Mother      Hyperlipidemia Mother       Depression Mother      Coronary Artery Disease Father      Hypertension Father      Hyperlipidemia Father      Coronary Artery Disease Maternal Grandmother      Coronary Artery Disease Maternal Grandfather        Social History     Socioeconomic History     Marital status:      Spouse name: Not on file     Number of children: Not on file     Years of education: Not on file     Highest education level: Not on file   Occupational History     Not on file   Social Needs     Financial resource strain: Not on file     Food insecurity     Worry: Not on file     Inability: Not on file     Transportation needs     Medical: Not on file     Non-medical: Not on file   Tobacco Use     Smoking status: Former Smoker     Packs/day: 1.00     Years: 30.00     Pack years: 30.00     Types: Cigarettes     Start date: 5/3/1974     Quit date: 5/3/2005     Years since quitting: 15.9     Smokeless tobacco: Never Used   Substance and Sexual Activity     Alcohol use: Yes     Comment: 3 drinks /day     Drug use: Not Currently     Types: Marijuana     Sexual activity: Not Currently     Partners: Male     Birth control/protection: Post-menopausal   Lifestyle     Physical activity     Days per week: Not on file     Minutes per session: Not on file     Stress: Not on file   Relationships     Social connections     Talks on phone: Not on file     Gets together: Not on file     Attends [employer] service: Not on file     Active member of club or organization: Not on file     Attends meetings of clubs or organizations: Not on file     Relationship status: Not on file     Intimate partner violence     Fear of current or ex partner: Not on file     Emotionally abused: Not on file     Physically abused: Not on file     Forced sexual activity: Not on file   Other Topics Concern     Parent/sibling w/ CABG, MI or angioplasty before 65F 55M? Yes     Comment: Father bypass   Social History Narrative     Not on file       Current Outpatient Medications  "  Medication Sig Dispense Refill     amLODIPine (NORVASC) 5 MG tablet TAKE 1 TABLET DAILY 90 tablet 3     aspirin (ASA) 81 MG tablet Take 1 tablet (81 mg) by mouth daily       atorvastatin (LIPITOR) 40 MG tablet TAKE 1 TABLET AT BEDTIME (DUE FOR A FASTING LAB APPOINTMENT AND APPOINTMENT WITH PROVIDER FOR FURTHER REFILLS) 90 tablet 0     bisacodyl (DULCOLAX) 5 MG EC tablet Take 1 tablet (5 mg) by mouth See Admin Instructions --Take 1 tablet bisacodyl at 12:00 the day prior to procedure. 1 tablet 0     escitalopram (LEXAPRO) 20 MG tablet TAKE 1 TABLET DAILY (APPOINTMENT NEEDED FOR FURTHER REFILLS) 90 tablet 0     gemfibrozil (LOPID) 600 MG tablet TAKE 1 TABLET TWICE A DAY BEFORE MEALS 180 tablet 3     lisinopril (ZESTRIL) 40 MG tablet TAKE 1 TABLET DAILY 90 tablet 3     magnesium 250 MG tablet Take 1 tablet by mouth daily       Na Sulfate-K Sulfate-Mg Sulf (SUPREP BOWEL PREP) solution Take 177 mLs (1 Bottle) by mouth See Admin Instructions Follow bowel prep instructions from clinic 2 Bottle 0     Omega-3 Fatty Acids (FISH OIL OMEGA-3 PO)        omeprazole (PRILOSEC) 20 MG DR capsule TAKE 1 CAPSULE DAILY (APPOINTMENT NEEDED FOR FURTHER REFILLS) 90 capsule 0     polyethylene glycol (GOLYTELY) 236 g suspension Take 4,000 mLs (4 L) by mouth See Admin Instructions Follow bowel prep instructions from clinic 4 L 0     vitamin B-Complex Take 1 tablet by mouth daily         PHYSICAL EXAMINATION:  Blood pressure (!) 142/85, pulse 80, temperature 97.7  F (36.5  C), temperature source Tympanic, height 1.613 m (5' 3.5\"), weight 82.6 kg (182 lb), SpO2 97 %.  General appearance - healthy, alert and no distress  Skin - Skin color, texture, turgor normal. No rashes or lesions.  Head - Normocephalic. No masses, lesions, tenderness or abnormalities  Eyes - conjunctivae/corneas clear. PERRL, EOM's intact. Fundi benign  Ears - External ears normal. Canals clear. TM's normal.  Nose/Sinuses - Nares normal. Septum midline. Mucosa normal. No " drainage or sinus tenderness.  Oropharynx - Lips, mucosa, and tongue normal. Teeth and gums normal.  Neck - Neck supple. No adenopathy. Thyroid symmetric, normal size,  Lungs - Percussion normal. Good diaphragmatic excursion. Lungs clear  Heart - PMI normal. No lifts, heaves, or thrills. RRR. No murmurs, clicks gallops or rub  Breasts - Breasts normal to inspection and palpation. Axillae negative  Abdomen - Abdomen soft, non-tender. BS normal. No masses, organomegaly  Extremities - Extremities normal. No deformities, edema, or skin discoloration.  Musculoskeletal - Spine ROM normal. Muscular strength intact.  Peripheral pulses - radial=4/4, femoral=4/4, popliteal=4/4, dorsalis pedis=4/4,  Neuro - Gait normal. Reflexes normal and symmetric. Sensation grossly WNL.        Hospital Outpatient Visit on 07/21/2020   Component Date Value Ref Range Status     Copath Report 07/21/2020    Final                    Value:Patient Name: KAREEM AU  MR#: [id number]  Specimen #: [id number]  Collected: 7/21/2020  Received: 7/22/2020  Reported: 7/23/2020 13:04  Ordering Phy(s): ERWIN WILLIS    For improved result formatting, select 'View Enhanced Report Format' under   Linked Documents section.    SPECIMEN(S):  A: Colon polyp, right/ascending  B: Rectal polyp    FINAL DIAGNOSIS:  A. Right/Ascending colon, polypectomy x2:  - Tubular adenoma x 1  - No evidence of high-grade dysplasia or invasive malignancy  - Sessile serrated adenoma without dysplasia x 1    B. Rectum, polypectomy:  - Tubular adenoma  - No evidence of high-grade dysplasia or invasive malignancy    I have personally reviewed all specimens and/or slides, including the   listed special stains, and used them  with my medical judgement to determine or confirm the final diagnosis.    Electronically signed out by:    Beto Davies M.D., [facility]    CLINICAL HISTORY:  Screening colonoscopy.    GROSS:  A:  The specimen is received in f                           "ormalin with proper patient   identification, labeled \"ascending polyps 72,  cold snare\".  The specimen consists of two pieces of yellow-tan soft   tissue ranging in size from 0.1-0.5 cm in  greatest dimension, which are entirely submitted in cassette A1.    B:  The specimen is received in formalin with proper patient   identification, labeled \"large intestine, rectum  cold snare\".  The specimen consists of a 0.7 x 0.4 x 0.2 cm piece of   yellow-tan soft tissue, which is bisected  and entirely submitted in cassette B1. (Dictated by: Venessa Russell   7/22/2020 09:01 AM)    MICROSCOPIC:  Microscopic examination was performed.    The technical component of this testing was completed at the [facility] Center, with the professional component performed   at the [facility], [address] [place] [phone] ([phone])    CPT Codes:  A: 63314-YK3  B: 16803-EY9    COLLEC                          TION SITE:  Client: [facility]  Location: [facility] (B)    Resident  [id number]       COLONOSCOPY 07/21/2020    Final                    Value:[facility]  Endoscopy Department-[place]  _______________________________________________________________________________  Patient Name: Leeann Babintler           Procedure Date: 7/21/2020 10:14 AM  MRN: [id number]                       YOB: 1960  Admit Type: Outpatient                Age: 59  Gender: Female                        Note Status: Finalized  Attending MD: Yimi Nettles ,   Instrument Name: [id number]  _______________________________________________________________________________     Procedure:                Colonoscopy  Indications:              Screening for colorectal malignant neoplasm  Providers:                Jagruti Gotti MD:         "     Yonatan Rachel MD  Medicines:                Midazolam 3 mg IV, Fentanyl 150 micrograms IV  Complications:            No immediate complications.  _______________________________________________________________________________  Procedure:                                          Pre-Anesthesia Assessment:                            - Prior to the procedure, a History and Physical                             was performed, and patient medications and                             allergies were reviewed. The risks and benefits of                             the procedure and the sedation options and risks                             were discussed with the patient. All questions were                             answered and informed consent was obtained. Patient                             identification and proposed procedure were verified                             by the physician in the pre-procedure area. Mental                             Status Examination: alert and oriented. Airway                             Examination: normal oropharyngeal airway and neck                             mobility. Respiratory Examination: clear to                             auscultation. CV Examination: normal. Prophylactic                             Ant                          ibiotics: The patient does not require                             prophylactic antibiotics. Prior Anticoagulants: The                             patient has taken no previous anticoagulant or                             antiplatelet agents except for aspirin. ASA Grade                             Assessment: II - A patient with mild systemic                             disease. After reviewing the risks and benefits,                             the patient was deemed in satisfactory condition to                             undergo the procedure. The anesthesia plan was to                             use moderate sedation / analgesia  (conscious                             sedation). This assessment was completed before the                             administration of sedation at 10:20 AM.                            After obtaining informed consent, the colonoscope                             was passed under direct vision. Throughout the                             procedure, the pat                          ient's blood pressure, pulse, and                             oxygen saturations were monitored continuously. The                             was introduced through the anus and advanced to the                             terminal ileum, with identification of the                             appendiceal orifice and IC valve. The colonoscopy                             was performed without difficulty. The patient                             tolerated the procedure well. The quality of the                             bowel preparation was good.                                                                                   Findings:       The perianal and digital rectal examinations were normal. Pertinent        negatives include normal sphincter tone.       Three sessile polyps were found in the rectum and ascending colon (x2).        The polyps were 1 to 2 mm in size. These polyps were removed with a lift        and cut technique using a cold snare. Resection and retrieval were                                  complete.       The terminal ileum appeared normal.       The exam was otherwise without abnormality on direct and retroflexion        views.                                                                                   Moderate Sedation:       Moderate (conscious) sedation was administered by the endoscopy nurse        and supervised by the endoscopist. The patient's oxygen saturation,        heart rate, blood pressure and response to care were monitored. Total        physician intraservice time was 20 minutes.  Impression:                - Three 1 to 2 mm polyps in the rectum and in the                             ascending colon, removed using lift and cut and a                             cold snare. Resected and retrieved.                            - The examined portion of the ileum was normal.                            - The examination was otherwise normal on direct                             and retroflexion views.  Recommendation:           - Patient has a contact nu                          mber available for                             emergencies. The signs and symptoms of potential                             delayed complications were discussed with the                             patient. Return to normal activities tomorrow.                             Written discharge instructions were provided to the                             patient.                            - Resume previous diet.                            - Continue present medications.                            - Await pathology results.                            - I will send you and your primary care physician a                             letter once I get the pathology results back, this                             may take a week or more to arrive                            - If the pathology report reveals adenomatous                             tissue, then repeat the colonoscopy for                             surveillance of multiple adenomas in 3 - 5 years.                            - Return                           to primary care physician PRN.                                                                                     Yimi Nettles MD  ______________________  Yimi Nettles,   7/21/2020 10:57:58 AM  I was physically present for the entire viewing portion of the exam.Yimi Nettles  Number of Addenda: 0    Note Initiated On: 7/21/2020 10:14 AM  Scope In:  Scope Out:         ASSESSMENT:    ICD-10-CM    1. Routine general  medical examination at a health care facility  Z00.00        Well-Adult Physical Exam.  Health Maintenance Due   Topic Date Due     ANNUAL REVIEW OF HM ORDERS  Never done     Health Maintenance   Topic Date Due     ANNUAL REVIEW OF HM ORDERS  Never done     MAMMO SCREENING  09/12/2021     PREVENTIVE CARE VISIT  04/28/2022     HPV TEST  09/12/2024     PAP  09/12/2024     DTAP/TDAP/TD IMMUNIZATION (2 - Td) 12/19/2024     LIPID  01/17/2025     ADVANCE CARE PLANNING  02/14/2025     COLORECTAL CANCER SCREENING  07/21/2030     HEPATITIS C SCREENING  Completed     HIV SCREENING  Completed     PHQ-2  Completed     INFLUENZA VACCINE  Completed     ZOSTER IMMUNIZATION  Completed     COVID-19 Vaccine  Completed     Pneumococcal Vaccine: Pediatrics (0 to 5 Years) and At-Risk Patients (6 to 64 Years)  Aged Out     IPV IMMUNIZATION  Aged Out     MENINGITIS IMMUNIZATION  Aged Out     HEPATITIS B IMMUNIZATION  Aged Out         HEALTH CARE MAINTENENCE: The recommended screening tests and vaccinatons for this patient have been discussed as above.  The appropriate tests and vaccinations  have been ordered or declined by the patient. Please see the orders in EPIC.The patient specifically declines: n/a     Immunization Status:  up to date and documented except for Hep A #2    Patient Active Problem List   Diagnosis     IGT (impaired glucose tolerance)     Essential hypertension     Hypercholesterolemia     Gastroesophageal reflux disease, esophagitis presence not specified        ATP III Guidelines  ICSI Preventive Guidelines    PLAN:  Increase amlodipine to 10 and work on diet, exercise and weight loss  Follow up in 3 month(s) for a blood pressure recheck     Check a fasting lipid profile  I recommended continuing to take a daily aspirin (81 to 325 mg)  HIV testing was discussed but the pt declined  Hep A #2  vaccine recommended 2 weeks after the last coronavirus vaccination(s)   Breast self exam demonstrated and recommended  Check a  fasting glucose  Mammogram recommended  Pap smear was not recommended per the ACOG guidelines  Discussed calcium intake, vitamins and supplements. Recommended 1200 mg of calcium daily  Weight loss through diet and exercise was recommended  Sunscreen use was recommended especially in the area of tatoos  Recommended dental exams every 6 months  Recommended eye exam every 1-2 years  Follow up in 1 year for the next preventative medical visit      Osteoporosis [place] indications:  Post menopausal women with a hip or vertebral fracture  Any T score less than or equal to -2.5  Any T score between -1.0 and -2.5 and a 10 year hip fracture probability > or = to 3%  Any T score between -1.0 and -2.5 and a 10 year probability or a major osteoporosis-related fracture  > or = 20% based on FRAX score  [email or web address]              Body mass index is 31.73 kg/m .

## 2021-06-06 DIAGNOSIS — K21.9 GASTROESOPHAGEAL REFLUX DISEASE: ICD-10-CM

## 2021-06-06 DIAGNOSIS — K21.9 GASTROESOPHAGEAL REFLUX DISEASE, UNSPECIFIED WHETHER ESOPHAGITIS PRESENT: Primary | ICD-10-CM

## 2021-08-20 ENCOUNTER — ANCILLARY PROCEDURE (OUTPATIENT)
Dept: MAMMOGRAPHY | Facility: CLINIC | Age: 61
End: 2021-08-20
Attending: FAMILY MEDICINE
Payer: COMMERCIAL

## 2021-08-20 DIAGNOSIS — Z12.31 ENCOUNTER FOR SCREENING MAMMOGRAM FOR BREAST CANCER: ICD-10-CM

## 2021-08-20 PROCEDURE — 77067 SCR MAMMO BI INCL CAD: CPT | Mod: TC | Performed by: RADIOLOGY

## 2021-09-19 ENCOUNTER — HEALTH MAINTENANCE LETTER (OUTPATIENT)
Age: 61
End: 2021-09-19

## 2021-09-19 DIAGNOSIS — I10 ESSENTIAL HYPERTENSION: ICD-10-CM

## 2021-09-20 RX ORDER — AMLODIPINE BESYLATE 10 MG/1
TABLET ORAL
Qty: 90 TABLET | Refills: 3 | Status: SHIPPED | OUTPATIENT
Start: 2021-09-20 | End: 2022-09-15

## 2021-09-20 NOTE — TELEPHONE ENCOUNTER
Routing refill request to provider for review/approval because:  BP Readings from Last 3 Encounters:   04/28/21 (!) 142/85   07/21/20 110/63   02/14/20 126/74     Abbie Simms RN

## 2021-09-23 NOTE — PROGRESS NOTES
Please review lab orders sign and close encounter. Rhonda Nelson MA/TARA    Physical 1/24/20   What Type Of Note Output Would You Prefer (Optional)?: Bullet Format What Is The Reason For Today's Visit?: Full Body Skin Examination What Is The Reason For Today's Visit? (Being Monitored For X): the development of a new lesion How Severe Are Your Spot(S)?: mild

## 2022-03-09 DIAGNOSIS — F41.9 ANXIETY: ICD-10-CM

## 2022-03-10 RX ORDER — ESCITALOPRAM OXALATE 20 MG/1
TABLET ORAL
Qty: 90 TABLET | Refills: 0 | Status: SHIPPED | OUTPATIENT
Start: 2022-03-10 | End: 2022-06-08

## 2022-05-09 ENCOUNTER — OFFICE VISIT (OUTPATIENT)
Dept: FAMILY MEDICINE | Facility: CLINIC | Age: 62
End: 2022-05-09
Payer: COMMERCIAL

## 2022-05-09 VITALS
HEART RATE: 79 BPM | BODY MASS INDEX: 31.18 KG/M2 | HEIGHT: 63 IN | WEIGHT: 176 LBS | OXYGEN SATURATION: 96 % | SYSTOLIC BLOOD PRESSURE: 140 MMHG | RESPIRATION RATE: 18 BRPM | DIASTOLIC BLOOD PRESSURE: 78 MMHG | TEMPERATURE: 98.2 F

## 2022-05-09 DIAGNOSIS — H92.01 RIGHT EAR PAIN: Primary | ICD-10-CM

## 2022-05-09 DIAGNOSIS — M26.609 TMJ (TEMPOROMANDIBULAR JOINT SYNDROME): ICD-10-CM

## 2022-05-09 PROCEDURE — 99213 OFFICE O/P EST LOW 20 MIN: CPT | Performed by: FAMILY MEDICINE

## 2022-05-09 ASSESSMENT — PAIN SCALES - GENERAL: PAINLEVEL: NO PAIN (0)

## 2022-05-09 NOTE — PROGRESS NOTES
"  Assessment & Plan     (H92.01) Right ear pain  (primary encounter diagnosis)  (M24.078) TMJ (temporomandibular joint syndrome)  Comment: Likely TMD based on pain characterization and lack of findings on physical exam. Will refer to PT and encourage ice/massage and Flonase for symptom management. If pain progresses and involves constitutional symptoms, ear drainage, hearing issues, or erythema/exudate of TM/canal on exam would consider amox.   Plan: Physical Therapy Referral        Try Flonase daily for allergic symptom control, use ice/massage and ibuprofen as needed for pain.                BMI:   Estimated body mass index is 31.18 kg/m  as calculated from the following:    Height as of this encounter: 1.6 m (5' 3\").    Weight as of this encounter: 79.8 kg (176 lb).   Weight management plan: Discussed healthy diet and exercise guidelines    See Patient Instructions    Return in about 2 months (around 7/9/2022) for Preventive exam, Fasting Lab Work.    Ana Deras RN  DNP-FNP Student, [facility]  *patient seen by NP student with supervision from MD Felisha Oseguera MD  [facility]   Leeann is a 61 year old who presents for the following health issues     R ear has been painful x 3 days. She describes the pain as \"soreness\" or \"zingers\" that are concentrated on her R mandible and sometimes radiates into ear canal. Has had many URIs this winter/spring, and has recently had some sinus congestion. No hearing loss or ringing in the ears. Temps at home reading 99F. No trouble swallowing. No recent water activity like swimming. Hx of TMD and was directed to wear a mouthguard overnight; does not usually wear it, however, as the mouthguard ends up in the sheets by AM.     History of Present Illness       Reason for visit:  Ear ache  Symptom onset:  1-3 days ago  Symptoms include:  Ear ache  Symptom intensity:  Mild  Symptom progression:  Staying the same  Had these " "symptoms before:  No  What makes it worse:  No  What makes it better:  No    She eats 0-1 servings of fruits and vegetables daily.She consumes 0 sweetened beverage(s) daily.She exercises with enough effort to increase her heart rate 20 to 29 minutes per day.  She exercises with enough effort to increase her heart rate 3 or less days per week.   She is taking medications regularly.             Review of Systems   CONSTITUTIONAL: NEGATIVE for fever, chills, change in weight  ENT/MOUTH: POSITIVE for eye drainage and ear pain right  RESP: NEGATIVE for significant cough or SOB  CV: NEGATIVE for chest pain, palpitations or peripheral edema      Objective    BP (!) 140/78   Pulse 79   Temp 98.2  F (36.8  C) (Tympanic)   Resp 18   Ht 1.6 m (5' 3\")   Wt 79.8 kg (176 lb)   SpO2 96%   BMI 31.18 kg/m    Body mass index is 31.18 kg/m .  Physical Exam   GENERAL: healthy, alert and no distress  HENT: ear canals and TM's normal, nose and mouth/oropharynx without ulcers or lesions. Clear eye drainage present. R TMJ space is noticeably smaller than L, surrounding tissue is firm upon palpation. Faint click palpated in R TMJ space with jaw movement.   NECK: no adenopathy, no asymmetry, masses, or scars and thyroid normal to palpation  RESP: lungs clear to auscultation - no rales, rhonchi or wheezes  CV: regular rate and rhythm, normal S1 S2, no S3 or S4, no murmur, click or rub, no peripheral edema and peripheral pulses strong  ABDOMEN: soft, nontender, no hepatosplenomegaly, no masses and bowel sounds normal  MS: no gross musculoskeletal defects noted, no edema                "

## 2022-05-09 NOTE — NURSING NOTE
"Chief Complaint   Patient presents with     Ear Problem     Right ear, x 3 days       Initial BP (!) 147/73   Pulse 79   Temp 98.2  F (36.8  C) (Tympanic)   Resp 18   Ht 1.6 m (5' 3\")   Wt 79.8 kg (176 lb)   SpO2 96%   BMI 31.18 kg/m   Estimated body mass index is 31.18 kg/m  as calculated from the following:    Height as of this encounter: 1.6 m (5' 3\").    Weight as of this encounter: 79.8 kg (176 lb).  Medication Reconciliation: complete  Evie Rodriguez CMA  "

## 2022-05-09 NOTE — PROGRESS NOTES
"  SUBJECTIVE:  Leeann Johnson is a 61 year old female who presents with the following concerns;              Symptoms: cc Present Absent Comment   Fever/Chills       Fatigue       Muscle Aches       Eye Irritation       Sneezing       Nasal Jem/Drg       Sinus Pressure/Pain       Loss of smell       Dental pain       Sore Throat       Swollen Glands       Ear Pain/Fullness       Cough       Wheeze       Chest Pain       Shortness of breath       Rash       Other         Symptom duration:  ***   Sympom severity:  ***   Treatments tried:  ***   Contacts:  ***       {HISTORY/MED UPDATE:242577::\"Medications updated and reviewed.\",\"Past, family and surgical history is updated and reviewed in the record.\"}  ROS:  {ROSTAL ENT :632993}  OBJECTIVE:  {OTAL ENT:486670}  {DIAG PICKLIST:226010}          "

## 2022-06-16 ENCOUNTER — OFFICE VISIT (OUTPATIENT)
Dept: URGENT CARE | Facility: URGENT CARE | Age: 62
End: 2022-06-16
Payer: COMMERCIAL

## 2022-06-16 VITALS
HEART RATE: 86 BPM | BODY MASS INDEX: 31.07 KG/M2 | DIASTOLIC BLOOD PRESSURE: 82 MMHG | OXYGEN SATURATION: 97 % | SYSTOLIC BLOOD PRESSURE: 165 MMHG | RESPIRATION RATE: 20 BRPM | TEMPERATURE: 98.8 F | WEIGHT: 175.4 LBS

## 2022-06-16 DIAGNOSIS — R07.0 THROAT PAIN: Primary | ICD-10-CM

## 2022-06-16 DIAGNOSIS — J02.0 STREP THROAT: ICD-10-CM

## 2022-06-16 LAB — DEPRECATED S PYO AG THROAT QL EIA: POSITIVE

## 2022-06-16 PROCEDURE — 87880 STREP A ASSAY W/OPTIC: CPT | Performed by: PREVENTIVE MEDICINE

## 2022-06-16 PROCEDURE — 99213 OFFICE O/P EST LOW 20 MIN: CPT | Performed by: PREVENTIVE MEDICINE

## 2022-06-16 RX ORDER — AMOXICILLIN 500 MG/1
500 CAPSULE ORAL 2 TIMES DAILY
Qty: 20 CAPSULE | Refills: 0 | Status: SHIPPED | OUTPATIENT
Start: 2022-06-16 | End: 2022-06-26

## 2022-06-16 NOTE — PROGRESS NOTES
Assessment & Plan   1. Strep throat  - amoxicillin (AMOXIL) 500 MG capsule; Take 1 capsule (500 mg) by mouth 2 times daily for 10 days  Dispense: 20 capsule; Refill: 0  Amoxicillin 500 mg two times per day for 10 day  Tylenol, lozenges, salt water gargles as needed  Follow up if not improving in 10-14 days                No follow-ups on file.    Brandon Ivy MD  [facility]    Subjective     Leeann Johnson is a 61 year old year old female who presents to clinic today for the following health issues:    Patient presents with:  Pharyngitis: Sore throat for a couple days  Exposed to strep  This is a 60 yo female who had her grandchild and a friend to her cabin a few days ago when one of them developed a sore throat.  She brought them home and now she has developed a sore throat without other symptoms.  No fever, cough, congestion, sob, cp, wheezing.  No problems swallowing or breathing    Patient Active Problem List   Diagnosis     IGT (impaired glucose tolerance)     Essential hypertension     Hypercholesterolemia     Gastroesophageal reflux disease, esophagitis presence not specified       Current Outpatient Medications   Medication     amLODIPine (NORVASC) 10 MG tablet     amoxicillin (AMOXIL) 500 MG capsule     aspirin (ASA) 81 MG tablet     atorvastatin (LIPITOR) 40 MG tablet     escitalopram (LEXAPRO) 20 MG tablet     gemfibrozil (LOPID) 600 MG tablet     lisinopril (ZESTRIL) 40 MG tablet     magnesium 250 MG tablet     Omega-3 Fatty Acids (FISH OIL OMEGA-3 PO)     omeprazole (PRILOSEC) 20 MG DR capsule     vitamin B-Complex     No current facility-administered medications for this visit.       Past Medical History:   Diagnosis Date     Hypertension 2018       Social History   reports that she quit smoking about 17 years ago. Her smoking use included cigarettes. She started smoking about 48 years ago. She has a 30.00 pack-year smoking history. She has never used smokeless  tobacco. She reports current alcohol use. She reports previous drug use. Drug: Marijuana.    Family History   Problem Relation Age of Onset     Diabetes Mother      Hypertension Mother      Hyperlipidemia Mother      Depression Mother      Coronary Artery Disease Father      Hypertension Father      Hyperlipidemia Father      Coronary Artery Disease Maternal Grandmother      Coronary Artery Disease Maternal Grandfather        Review of Systems  Constitutional, HEENT, cardiovascular, pulmonary, GI, , musculoskeletal, neuro, skin, endocrine and psych systems are negative, except as otherwise noted.      Objective    BP (!) 165/82   Pulse 86   Temp 98.8  F (37.1  C) (Tympanic)   Resp 20   Wt 79.6 kg (175 lb 6.4 oz)   SpO2 97%   BMI 31.07 kg/m    Physical Exam   GENERAL: healthy, alert and no distress  EYES: Eyes grossly normal to inspection, PERRL and conjunctivae and sclerae normal  HENT: ear canals and TM's normal, nose without ulcers or lesions, mouth with posterior pharyngeal erythema.  Uvula midline and posterior oropharynx widely patent.  NECK: no adenopathy, no asymmetry, masses, or scars and thyroid normal to palpation  RESP: lungs clear to auscultation - no rales, rhonchi or wheezes  CV: regular rate and rhythm, normal S1 S2, no S3 or S4, no murmur, click or rub, no peripheral edema and peripheral pulses strong  MS: no gross musculoskeletal defects noted, no edema  SKIN: no suspicious lesions or rashes  NEURO: Normal strength and tone, mentation intact and speech normal  PSYCH: mentation appears normal, affect normal/bright    Results for orders placed or performed in visit on 06/16/22 (from the past 24 hour(s))   Streptococcus A Rapid Screen w/Reflex to PCR - Clinic Collect    Specimen: Throat; Swab   Result Value Ref Range    Group A Strep antigen Positive (A) Negative

## 2022-06-25 ENCOUNTER — HEALTH MAINTENANCE LETTER (OUTPATIENT)
Age: 62
End: 2022-06-25

## 2022-06-26 DIAGNOSIS — E78.00 HYPERCHOLESTEROLEMIA: ICD-10-CM

## 2022-06-27 RX ORDER — ATORVASTATIN CALCIUM 40 MG/1
TABLET, FILM COATED ORAL
Qty: 90 TABLET | Refills: 3 | Status: SHIPPED | OUTPATIENT
Start: 2022-06-27 | End: 2024-01-04

## 2022-07-19 DIAGNOSIS — I10 ESSENTIAL HYPERTENSION: ICD-10-CM

## 2022-07-19 DIAGNOSIS — E78.00 HYPERCHOLESTEROLEMIA: ICD-10-CM

## 2022-07-21 RX ORDER — LISINOPRIL 40 MG/1
TABLET ORAL
Qty: 90 TABLET | Refills: 3 | Status: SHIPPED | OUTPATIENT
Start: 2022-07-21 | End: 2023-12-19

## 2022-07-21 RX ORDER — GEMFIBROZIL 600 MG/1
TABLET, FILM COATED ORAL
Qty: 180 TABLET | Refills: 3 | Status: SHIPPED | OUTPATIENT
Start: 2022-07-21 | End: 2023-11-13

## 2022-09-07 ASSESSMENT — ENCOUNTER SYMPTOMS
MYALGIAS: 0
PALPITATIONS: 0
HEADACHES: 0
SHORTNESS OF BREATH: 0
JOINT SWELLING: 0
DIARRHEA: 1
FEVER: 0
ABDOMINAL PAIN: 0
COUGH: 0
SORE THROAT: 0
HEMATOCHEZIA: 0
PARESTHESIAS: 0
EYE PAIN: 0
BREAST MASS: 0
FREQUENCY: 1
HEARTBURN: 0
NAUSEA: 0
DIZZINESS: 0
ARTHRALGIAS: 0
WEAKNESS: 0
CONSTIPATION: 0
HEMATURIA: 0
NERVOUS/ANXIOUS: 0
DYSURIA: 0
CHILLS: 0

## 2022-09-08 ENCOUNTER — OFFICE VISIT (OUTPATIENT)
Dept: FAMILY MEDICINE | Facility: CLINIC | Age: 62
End: 2022-09-08
Payer: COMMERCIAL

## 2022-09-08 ENCOUNTER — MYC MEDICAL ADVICE (OUTPATIENT)
Dept: FAMILY MEDICINE | Facility: CLINIC | Age: 62
End: 2022-09-08

## 2022-09-08 VITALS
HEIGHT: 63 IN | HEART RATE: 79 BPM | TEMPERATURE: 97.8 F | OXYGEN SATURATION: 97 % | SYSTOLIC BLOOD PRESSURE: 136 MMHG | BODY MASS INDEX: 31.36 KG/M2 | WEIGHT: 177 LBS | DIASTOLIC BLOOD PRESSURE: 80 MMHG

## 2022-09-08 DIAGNOSIS — Z00.00 ROUTINE GENERAL MEDICAL EXAMINATION AT A HEALTH CARE FACILITY: Primary | ICD-10-CM

## 2022-09-08 DIAGNOSIS — E78.00 HYPERCHOLESTEROLEMIA: ICD-10-CM

## 2022-09-08 DIAGNOSIS — I10 ESSENTIAL HYPERTENSION: ICD-10-CM

## 2022-09-08 DIAGNOSIS — R73.03 PREDIABETES: ICD-10-CM

## 2022-09-08 DIAGNOSIS — F41.9 ANXIETY: ICD-10-CM

## 2022-09-08 DIAGNOSIS — Z12.31 ENCOUNTER FOR SCREENING MAMMOGRAM FOR BREAST CANCER: ICD-10-CM

## 2022-09-08 DIAGNOSIS — K21.9 GASTROESOPHAGEAL REFLUX DISEASE, UNSPECIFIED WHETHER ESOPHAGITIS PRESENT: ICD-10-CM

## 2022-09-08 LAB
ALBUMIN SERPL-MCNC: 3.9 G/DL (ref 3.4–5)
ALP SERPL-CCNC: 65 U/L (ref 40–150)
ALT SERPL W P-5'-P-CCNC: 27 U/L (ref 0–50)
ANION GAP SERPL CALCULATED.3IONS-SCNC: 7 MMOL/L (ref 3–14)
AST SERPL W P-5'-P-CCNC: 29 U/L (ref 0–45)
BILIRUB SERPL-MCNC: 0.4 MG/DL (ref 0.2–1.3)
BUN SERPL-MCNC: 19 MG/DL (ref 7–30)
CALCIUM SERPL-MCNC: 9.8 MG/DL (ref 8.5–10.1)
CHLORIDE BLD-SCNC: 105 MMOL/L (ref 94–109)
CHOLEST SERPL-MCNC: 287 MG/DL
CO2 SERPL-SCNC: 25 MMOL/L (ref 20–32)
CREAT SERPL-MCNC: 0.55 MG/DL (ref 0.52–1.04)
FASTING STATUS PATIENT QL REPORTED: YES
GFR SERPL CREATININE-BSD FRML MDRD: >90 ML/MIN/1.73M2
GLUCOSE BLD-MCNC: 121 MG/DL (ref 70–99)
HDLC SERPL-MCNC: 73 MG/DL
LDLC SERPL CALC-MCNC: 156 MG/DL
LDLC SERPL CALC-MCNC: ABNORMAL MG/DL
NONHDLC SERPL-MCNC: 214 MG/DL
POTASSIUM BLD-SCNC: 4.4 MMOL/L (ref 3.4–5.3)
PROT SERPL-MCNC: 7.4 G/DL (ref 6.8–8.8)
SODIUM SERPL-SCNC: 137 MMOL/L (ref 133–144)
TRIGL SERPL-MCNC: 483 MG/DL

## 2022-09-08 PROCEDURE — 36415 COLL VENOUS BLD VENIPUNCTURE: CPT | Performed by: PHYSICIAN ASSISTANT

## 2022-09-08 PROCEDURE — 80061 LIPID PANEL: CPT | Performed by: PHYSICIAN ASSISTANT

## 2022-09-08 PROCEDURE — 90471 IMMUNIZATION ADMIN: CPT | Performed by: PHYSICIAN ASSISTANT

## 2022-09-08 PROCEDURE — 99396 PREV VISIT EST AGE 40-64: CPT | Mod: 25 | Performed by: PHYSICIAN ASSISTANT

## 2022-09-08 PROCEDURE — 90682 RIV4 VACC RECOMBINANT DNA IM: CPT | Performed by: PHYSICIAN ASSISTANT

## 2022-09-08 PROCEDURE — 99214 OFFICE O/P EST MOD 30 MIN: CPT | Mod: 25 | Performed by: PHYSICIAN ASSISTANT

## 2022-09-08 PROCEDURE — 80053 COMPREHEN METABOLIC PANEL: CPT | Performed by: PHYSICIAN ASSISTANT

## 2022-09-08 PROCEDURE — 83721 ASSAY OF BLOOD LIPOPROTEIN: CPT | Mod: 59 | Performed by: PHYSICIAN ASSISTANT

## 2022-09-08 RX ORDER — ESCITALOPRAM OXALATE 20 MG/1
20 TABLET ORAL DAILY
Qty: 90 TABLET | Refills: 2 | Status: CANCELLED | OUTPATIENT
Start: 2022-09-08

## 2022-09-08 RX ORDER — AMLODIPINE BESYLATE 10 MG/1
10 TABLET ORAL DAILY
Qty: 90 TABLET | Refills: 3 | Status: CANCELLED | OUTPATIENT
Start: 2022-09-08

## 2022-09-08 ASSESSMENT — ENCOUNTER SYMPTOMS
ABDOMINAL PAIN: 0
JOINT SWELLING: 0
DIZZINESS: 0
MYALGIAS: 0
SORE THROAT: 0
DYSURIA: 0
CHILLS: 0
EYE PAIN: 0
CONSTIPATION: 0
BREAST MASS: 0
NERVOUS/ANXIOUS: 0
HEMATOCHEZIA: 0
HEMATURIA: 0
SHORTNESS OF BREATH: 0
NAUSEA: 0
DIARRHEA: 1
FEVER: 0
COUGH: 0
HEARTBURN: 0
FREQUENCY: 1
ARTHRALGIAS: 0
HEADACHES: 0
PARESTHESIAS: 0
PALPITATIONS: 0
WEAKNESS: 0

## 2022-09-08 ASSESSMENT — PAIN SCALES - GENERAL: PAINLEVEL: NO PAIN (0)

## 2022-09-08 NOTE — PROGRESS NOTES
SUBJECTIVE:   CC: Leeann Johnson is an 62 year old woman who presents for preventive health visit.       Patient has been advised of split billing requirements and indicates understanding: Yes  Healthy Habits:     Getting at least 3 servings of Calcium per day:  Yes    Bi-annual eye exam:  NO    Dental care twice a year:  NO    Sleep apnea or symptoms of sleep apnea:  None    Diet:  Regular (no restrictions)    Frequency of exercise:  None    Taking medications regularly:  Yes    Medication side effects:  None    PHQ-2 Total Score: 0    Additional concerns today:  No      PROBLEMS TO ADD:  1. ESTABLISH CARE former patient of Dr. Rachel's. She will need a new primary provider.   2. Hypertension: managed with amlodipine 10 mg, lisinopril 40 mg   3. Anxiety: started during menopause. Treated with lexapro 20 mg  4. Hyperlipidemia: managed with gemfibrozil 600 mg twice daily and atorvastatin 40 mg daily. .  5. GERD: managed with omeprazole 20 mg daily    Today's PHQ-2 Score:   PHQ-2 (  [employer]) 2022   Q1: Little interest or pleasure in doing things 0   Q2: Feeling down, depressed or hopeless 0   PHQ-2 Score 0   PHQ-2 Total Score (12-17 Years)- Positive if 3 or more points; Administer PHQ-A if positive -   Q1: Little interest or pleasure in doing things Not at all   Q2: Feeling down, depressed or hopeless Not at all   PHQ-2 Score 0       Abuse: Current or Past (Physical, Sexual or Emotional) - No  Do you feel safe in your environment? Yes        Social History     Tobacco Use     Smoking status: Former Smoker     Packs/day: 1.00     Years: 30.00     Pack years: 30.00     Types: Cigarettes     Start date: 5/3/1974     Quit date: 5/3/2005     Years since quittin.3     Smokeless tobacco: Never Used   Substance Use Topics     Alcohol use: Yes     Comment: 3 drinks /day     If you drink alcohol do you typically have >3 drinks per day or >7 drinks per week? No    Alcohol Use 2022   Prescreen: >3 drinks/day  or >7 drinks/week? Yes   AUDIT SCORE  8     AUDIT - Alcohol Use Disorders Identification Test - Reproduced from the World Health Organization Audit 2001 (Second Edition) 2022   1.  How often do you have a drink containing alcohol? 4 or more times a week   2.  How many drinks containing alcohol do you have on a typical day when you are drinking? 3 or 4   3.  How often do you have five or more drinks on one occasion? Weekly   4.  How often during the last year have you found that you were not able to stop drinking once you had started? Never   5.  How often during the last year have you failed to do what was normally expected of you because of drinking? Never   6.  How often during the last year have you needed a first drink in the morning to get yourself going after a heavy drinking session? Never   7.  How often during the last year have you had a feeling of guilt or remorse after drinking? Never   8.  How often during the last year have you been unable to remember what happened the night before because of your drinking? Never   9.  Have you or someone else been injured because of your drinking? No   10. Has a relative, friend, doctor or other health care worker been concerned about your drinking or suggested you cut down? No   TOTAL SCORE 8       Reviewed orders with patient.  Reviewed health maintenance and updated orders accordingly - Yes  BP Readings from Last 3 Encounters:   22 136/80   22 (!) 165/82   22 (!) 140/78    Wt Readings from Last 3 Encounters:   22 80.3 kg (177 lb)   22 79.6 kg (175 lb 6.4 oz)   22 79.8 kg (176 lb)                  Patient Active Problem List   Diagnosis     IGT (impaired glucose tolerance)     Essential hypertension     Hypercholesterolemia     Gastroesophageal reflux disease, esophagitis presence not specified     Past Surgical History:   Procedure Laterality Date     BUNIONECTOMY Left 2009      SECTION       COLONOSCOPY        COLONOSCOPY WITH CO2 INSUFFLATION N/A 2020    Procedure: COLONOSCOPY, WITH CO2 INSUFFLATION;  Surgeon: Yimi Nettles MD;  Location: [facility]     GYN SURGERY      Tubular     TUBAL LIGATION         Social History     Tobacco Use     Smoking status: Former Smoker     Packs/day: 1.00     Years: 30.00     Pack years: 30.00     Types: Cigarettes     Start date: 5/3/1974     Quit date: 5/3/2005     Years since quittin.3     Smokeless tobacco: Never Used   Substance Use Topics     Alcohol use: Yes     Comment: 3 drinks /day     Family History   Problem Relation Age of Onset     Diabetes Mother      Hypertension Mother      Hyperlipidemia Mother      Depression Mother      Coronary Artery Disease Father      Hypertension Father      Hyperlipidemia Father      Coronary Artery Disease Maternal Grandmother      Coronary Artery Disease Maternal Grandfather          Current Outpatient Medications   Medication Sig Dispense Refill     amLODIPine (NORVASC) 10 MG tablet TAKE 1 TABLET DAILY 90 tablet 3     aspirin (ASA) 81 MG tablet Take 1 tablet (81 mg) by mouth daily       atorvastatin (LIPITOR) 40 MG tablet TAKE 1 TABLET AT BEDTIME (LAST REFILL, PLEASE SCHEDULE APPOINTMENT AND FASTING LABWORK) 90 tablet 3     escitalopram (LEXAPRO) 20 MG tablet Take 1 tablet (20 mg) by mouth daily 90 tablet 2     gemfibrozil (LOPID) 600 MG tablet TAKE 1 TABLET TWICE A DAY BEFORE MEALS 180 tablet 3     lisinopril (ZESTRIL) 40 MG tablet TAKE 1 TABLET DAILY 90 tablet 3     magnesium 250 MG tablet Take 1 tablet by mouth daily       Omega-3 Fatty Acids (FISH OIL OMEGA-3 PO)        omeprazole (PRILOSEC) 20 MG DR capsule TAKE 1 CAPSULE DAILY 90 capsule 2     vitamin B-Complex Take 1 tablet by mouth daily       VITAMIN D PO        No Known Allergies  Recent Labs   Lab Test 22  1050 21  1258 20  1019 10/30/19  0000 19  0000   A1C  --   --   --   --  5.2   * 134* 122*   < >  --    HDL 73 80 69   <  >  --    TRIG 483* 341* 238*   < >  --    ALT 27 33 29  --   --    CR 0.55 0.72 0.54   < >  --    GFRESTIMATED >90 >90 >90   < >  --    GFRESTBLACK  --  >90 >90   < >  --    POTASSIUM 4.4 4.2 4.7   < >  --     < > = values in this interval not displayed.        Breast Cancer Screening:    Breast CA Risk Assessment (FHS-7) 2021   Do you have a family history of breast, colon, or ovarian cancer? No / Unknown         Mammogram Screening: Recommended mammography every 1-2 years with patient discussion and risk factor consideration  Pertinent mammograms are reviewed under the imaging tab.    History of abnormal Pap smear: NO - age 30-65 PAP every 5 years with negative HPV co-testing recommended  Last 3 Pap and HPV Results:       Reviewed and updated as needed this visit by clinical staff   Tobacco  Allergies  Meds   Med Hx  Surg Hx  Fam Hx  Soc Hx          Reviewed and updated as needed this visit by Provider                   Past Medical History:   Diagnosis Date     Hypertension       Past Surgical History:   Procedure Laterality Date     BUNIONECTOMY Left 2009      SECTION       COLONOSCOPY       COLONOSCOPY WITH CO2 INSUFFLATION N/A 2020    Procedure: COLONOSCOPY, WITH CO2 INSUFFLATION;  Surgeon: Yimi Nettles MD;  Location: [facility]     GYN SURGERY      Tubular     TUBAL LIGATION       OB History   No obstetric history on file.       Review of Systems   Constitutional: Negative for chills and fever.   HENT: Negative for congestion, ear pain, hearing loss and sore throat.    Eyes: Negative for pain and visual disturbance.   Respiratory: Negative for cough and shortness of breath.    Cardiovascular: Negative for chest pain, palpitations and peripheral edema.   Gastrointestinal: Positive for diarrhea. Negative for abdominal pain, constipation, heartburn, hematochezia and nausea.   Breasts:  Negative for tenderness, breast mass and discharge.   Genitourinary:  "Positive for frequency. Negative for dysuria, genital sores, hematuria, pelvic pain, urgency, vaginal bleeding and vaginal discharge.   Musculoskeletal: Negative for arthralgias, joint swelling and myalgias.   Skin: Negative for rash.   Neurological: Negative for dizziness, weakness, headaches and paresthesias.   Psychiatric/Behavioral: Negative for mood changes. The patient is not nervous/anxious.           OBJECTIVE:   /80   Pulse 79   Temp 97.8  F (36.6  C) (Tympanic)   Ht 1.607 m (5' 3.25\")   Wt 80.3 kg (177 lb)   SpO2 97%   BMI 31.11 kg/m    Physical Exam  GENERAL APPEARANCE: healthy, alert and no distress  EYES: Eyes grossly normal to inspection, PERRL and conjunctivae and sclerae normal  HENT: ear canals and TM's normal, nose and mouth without ulcers or lesions, oropharynx clear and oral mucous membranes moist  NECK: no adenopathy, no asymmetry, masses, or scars and thyroid normal to palpation  RESP: lungs clear to auscultation - no rales, rhonchi or wheezes  BREAST: normal without masses, tenderness or nipple discharge and no palpable axillary masses or adenopathy  CV: regular rate and rhythm, normal S1 S2, no S3 or S4, no murmur, click or rub, no peripheral edema and peripheral pulses strong  ABDOMEN: soft, nontender, no hepatosplenomegaly, no masses and bowel sounds normal  MS: no musculoskeletal defects are noted and gait is age appropriate without ataxia  SKIN: no suspicious lesions or rashes  NEURO: Normal strength and tone, sensory exam grossly normal, mentation intact and speech normal  PSYCH: mentation appears normal and affect normal/bright    Diagnostic Test Results:  Results for orders placed or performed in visit on 09/08/22 (from the past 24 hour(s))   COMPREHENSIVE METABOLIC PANEL   Result Value Ref Range    Sodium 137 133 - 144 mmol/L    Potassium 4.4 3.4 - 5.3 mmol/L    Chloride 105 94 - 109 mmol/L    Carbon Dioxide (CO2) 25 20 - 32 mmol/L    Anion Gap 7 3 - 14 mmol/L    Urea " Nitrogen 19 7 - 30 mg/dL    Creatinine 0.55 0.52 - 1.04 mg/dL    Calcium 9.8 8.5 - 10.1 mg/dL    Glucose 121 (H) 70 - 99 mg/dL    Alkaline Phosphatase 65 40 - 150 U/L    AST 29 0 - 45 U/L    ALT 27 0 - 50 U/L    Protein Total 7.4 6.8 - 8.8 g/dL    Albumin 3.9 3.4 - 5.0 g/dL    Bilirubin Total 0.4 0.2 - 1.3 mg/dL    GFR Estimate >90 >60 mL/min/1.73m2   Lipid panel reflex to direct LDL Non-fasting   Result Value Ref Range    Cholesterol 287 (H) <200 mg/dL    Triglycerides 483 (H) <150 mg/dL    Direct Measure HDL 73 >=50 mg/dL    LDL Cholesterol Calculated      Non HDL Cholesterol 214 (H) <130 mg/dL    Patient Fasting > 8hrs? Yes     Narrative    Cholesterol  Desirable:  <200 mg/dL    Triglycerides  Normal:  Less than 150 mg/dL  Borderline High:  150-199 mg/dL  High:  200-499 mg/dL  Very High:  Greater than or equal to 500 mg/dL    Direct Measure HDL  Female:  Greater than or equal to 50 mg/dL   Male:  Greater than or equal to 40 mg/dL    LDL Cholesterol  Desirable:  <100mg/dL  Above Desirable:  100-129 mg/dL   Borderline High:  130-159 mg/dL   High:  160-189 mg/dL   Very High:  >= 190 mg/dL    Non HDL Cholesterol  Desirable:  130 mg/dL  Above Desirable:  130-159 mg/dL  Borderline High:  160-189 mg/dL  High:  190-219 mg/dL  Very High:  Greater than or equal to 220 mg/dL   LDL cholesterol direct   Result Value Ref Range    LDL Cholesterol Direct 156 (H) <100 mg/dL       ASSESSMENT/PLAN:   (Z00.00) Routine general medical examination at a health care facility  (primary encounter diagnosis)  Comment: Health maintenance reviewed and updated.    (Z12.31) Encounter for screening mammogram for breast cancer  Plan: *MA Screening Digital Bilateral            (I10) Essential hypertension  Comment: stable, she does not need refills of her medication at this time.   She will contact the clinic when needed. She has a send out pharmacy with all medications at home at this time.   Plan: COMPREHENSIVE METABOLIC PANEL         "    (F41.9) Anxiety  Comment: stable on the lexapro 20 mg daily   She does not need current refills and will notify when due.     (E78.00) Hypercholesterolemia  Comment: check fasting tests today.   She has been on the lopid and atorvastatin daily. She does not need refills currently and will notify if concerns.   Plan: Lipid panel reflex to direct LDL Non-fasting,         LDL cholesterol direct            GERD: managed with omeprazole 20 mg daily  She does not need refills at this time.   Condition is stable.       Patient has been advised of split billing requirements and indicates understanding: Yes    COUNSELING:  Reviewed preventive health counseling, as reflected in patient instructions       Regular exercise       Healthy diet/nutrition    Estimated body mass index is 31.11 kg/m  as calculated from the following:    Height as of this encounter: 1.607 m (5' 3.25\").    Weight as of this encounter: 80.3 kg (177 lb).    Weight management plan: Discussed healthy diet and exercise guidelines    She reports that she quit smoking about 17 years ago. Her smoking use included cigarettes. She started smoking about 48 years ago. She has a 30.00 pack-year smoking history. She has never used smokeless tobacco.      Counseling Resources:  ATP IV Guidelines  Pooled Cohorts Equation Calculator  Breast Cancer Risk Calculator  BRCA-Related Cancer Risk Assessment: FHS-7 Tool  FRAX Risk Assessment  ICSI Preventive Guidelines  Dietary Guidelines for Americans, 2010  USDA's MyPlate  ASA Prophylaxis  Lung CA Screening    Kristen M. Kehr, PA-C [facility]  "

## 2022-09-08 NOTE — NURSING NOTE
"Chief Complaint   Patient presents with     Physical       Initial /80   Pulse 79   Temp 97.8  F (36.6  C) (Tympanic)   Ht 1.607 m (5' 3.25\")   Wt 80.3 kg (177 lb)   SpO2 97%   BMI 31.11 kg/m   Estimated body mass index is 31.11 kg/m  as calculated from the following:    Height as of this encounter: 1.607 m (5' 3.25\").    Weight as of this encounter: 80.3 kg (177 lb).  Medication Reconciliation: complete    WILL Tsang MA    "

## 2022-09-14 DIAGNOSIS — I10 ESSENTIAL HYPERTENSION: ICD-10-CM

## 2022-09-15 RX ORDER — AMLODIPINE BESYLATE 10 MG/1
TABLET ORAL
Qty: 90 TABLET | Refills: 3 | Status: SHIPPED | OUTPATIENT
Start: 2022-09-15 | End: 2023-09-11

## 2022-09-22 ENCOUNTER — ANCILLARY PROCEDURE (OUTPATIENT)
Dept: MAMMOGRAPHY | Facility: CLINIC | Age: 62
End: 2022-09-22
Attending: PHYSICIAN ASSISTANT
Payer: COMMERCIAL

## 2022-09-22 DIAGNOSIS — Z12.31 ENCOUNTER FOR SCREENING MAMMOGRAM FOR BREAST CANCER: ICD-10-CM

## 2022-09-22 PROCEDURE — 77067 SCR MAMMO BI INCL CAD: CPT | Mod: TC | Performed by: RADIOLOGY

## 2022-10-24 ENCOUNTER — VIRTUAL VISIT (OUTPATIENT)
Dept: FAMILY MEDICINE | Facility: CLINIC | Age: 62
End: 2022-10-24
Payer: COMMERCIAL

## 2022-10-24 DIAGNOSIS — I10 ESSENTIAL HYPERTENSION: ICD-10-CM

## 2022-10-24 DIAGNOSIS — U07.1 INFECTION DUE TO 2019 NOVEL CORONAVIRUS: Primary | ICD-10-CM

## 2022-10-24 DIAGNOSIS — E78.00 HYPERCHOLESTEROLEMIA: ICD-10-CM

## 2022-10-24 PROCEDURE — 99214 OFFICE O/P EST MOD 30 MIN: CPT | Mod: CS | Performed by: FAMILY MEDICINE

## 2022-10-24 NOTE — PATIENT INSTRUCTIONS
COVID-19 Outpatient Treatments  Your care team can help you find the best treatments for COVID-19. Talk to a health care provider or refer to the FDA medicine fact sheets below.    Important: You CAN'T have molnupiravir or Paxlovid if you are starting the medicine more than 5 days after your symptoms have started.  Paxlovid: https://www.fda.gov/media/082978/download  Molnupiravir: https://www.fda.gov/media/534145/download  Monoclonal antibodies: https://combatcovid.hhs.gov/what-are-monoclonal-antibodies  Paxlovid (nimatrelvir and ritonavir)  How it works  Two medicines (nirmatrelvir and ritonavir) are taken together. They stop the virus from growing. Less amount of virus is easier for your body to fight.  Benefits  Lowers risk of a hospital stay or death from COVID-19.  How to take    Medicine comes in a daily container with both medicine tablets. Take by mouth twice daily (once in the morning, once at night) for 5 days.    The number of tablets to take varies by patient.    Don't chew or break capsules. Swallow whole.  When to take  Take as soon as possible after positive COVID-19 test result, and within 5 days of your first symptoms.  Who can take it  Patients must be 12 years or older, weigh at least 88 pounds, and have tested positive for COVID-19. This is the preferred treatment for pregnant patients.  Possible side effects  Can cause altered sense of taste, diarrhea (loose, watery stools), high blood pressure, muscle aches.  Medicine conflicts    Some medicines may conflict with Paxlovid and may cause serious side effects.    Tell your care team about all the medicines you take, including prescription and over-the-counter medicines, vitamins and herbal supplements.    Your provider will review your medicines to make sure that you can safely take Paxlovid.  Cautions    Paxlovid is not advised for patients with severe kidney or liver disease. If you have kidney or liver problems, the dose may need to be  changed.    If you are pregnant or breastfeeding, talk to your care team about your options.    If you are sexually active, use trusted birth control while taking Paxlovid.  Molnupiravir  How it works  Stops the virus from growing. Less amount of virus is easier for your body to fight.  Benefits  Lowers risk of a hospital stay or death from COVID-19.  How to take  Take 4 capsules by mouth every 12 hours (4 in the morning and 4 at night) for 5 days. Don't chew or break capsules. Swallow whole.  When to take  Take as soon as possible after positive COVID-19 test result, and within 5 days of your first symptoms.  Who can take it  Patients must be 18 years or older and have tested positive for COVID-19.  Possible side effects  Diarrhea (loose, watery stools), nausea (feeling sick to your stomach), dizziness, headaches.  Medicine conflicts  Right now, there are no known conflicts with other drugs. But tell your care team all medicines you take.  Cautions    This is not advised for patients who are pregnant.    Patients who could become pregnant should use trusted birth control until 4 days after their last dose.    Sexually active people of any gender should use trusted birth control for 3 months after their last dose.  Monoclonal antibodies  How it works  Monoclonal antibodies can detect pieces of the COVID virus and stop it from infecting your cells.  Benefits  Lowers risk of a hospital stay or death from COVID-19. Monoclonal antibodies are known to work well against the omicron variant.  How it is given to you  You will receive the treatment either by an infusion through your vein (IV) or shots.  When to take  Get as soon as possible after you test positive for COVID-19, and within 7 days of your first symptoms.  Who can take it  Patients must be 12 years or older, weigh at least 88 pounds and have tested positive for COVID-19.  Possible side effects  Fever, chills, diarrhea (loose, watery stools), dizziness,  itchiness and rash.  More serious side effects include: fever, difficulty breathing, low oxygen level in your blood, chills, tiredness, fast or slow heart rate, chest discomfort or pain, weakness, confusion, nausea, headache, shortness of breath, low or high blood pressure, wheezing, swelling of your lips, face, or throat, rash including hives, itching, muscle aches, dizziness, feeling faint and sweating.  If you receive an IV, you may have brief pain, bleeding and bruising of the skin, soreness, swelling and possible infection at the place where you get the IV needle.  Medicine conflicts  Please tell you care team other medicines you take so they can assess if there are any conflicts.  Cautions  Your doctor will talk with you about risks and benefits of this treatment and will help choose the best option for you.  For informational purposes only. Not to replace the advice of your health care provider.  Copyright   2022 [facility]. All rights reserved. Clinically reviewed by Teresa Cardoza. [employer] [id number] - 08/22.    Instructions for Patients      What are the symptoms of COVID-19?  Symptoms can include fever, cough, shortness of breath, chills, headache, muscle pain sore throat, fatigue, runny or stuffy nose, and loss of taste and smell. Other less common symptoms include nausea, vomiting, or diarrhea (watery stools).    Know when to call 911. Emergency warning signs include:    Trouble breathing or shortness of breath    Pain or pressure in the chest that doesn't go away    Feeling confused like you haven't felt before, or not being able to wake up    Bluish-colored lips or face    How can I take care of myself?  1. Get lots of rest. Drink extra fluids (unless a doctor has told you not to).  2. Take Tylenol (acetaminophen) for fever or pain. If you have liver or kidney problems, ask your family doctor if it's okay to take Tylenol   Adults:   650 mg (two 325 mg pills or tablets) every 4 to 6 hours,  or...   1,000 mg (two 500 mg pills or tablets) every 8 hours as needed.  Note: Don't take more than 3,000 mg in one day. Acetaminophen is found in many medicines (both prescribed and over the counter). Read all labels to be sure you don't take too much.  For children, check the Tylenol bottle for the right dose. The dose is based on the child's age or weight.  3. Take over the counter medicines for your symptoms as needed. Talk to your pharmacist.  4. If you have other health problems (like cancer, heart failure, an organ transplant, or severe kidney disease): Call your specialty clinic if you don't feel better in the next 2 days.    Where can I get more information?     [facility] COVID-19 Resource Hub: www.[email or web address].[email or web address]/covid19/     CDC Quarantine & Isolation: https://www.cdc.gov/coronavirus/2019-ncov/your-health/quarantine-isolation.html     CDC - What to Do If You're Sick: https://www.cdc.gov/coronavirus/2019-ncov/if-you-are-sick/index.html    [facility] clinical trials (COVID-19 research studies): clinicalaffairs.[facility]/umn-clinical-trials    [place] Department of Health COVID-19 Public Hotline: 1-817.280.3886  Coping with Life After COVID-19  Being in the hospital because of COVID-19 is scary. Going home can be scary, too. You may face changes to your life, the way you work or what you can eat. It s hard to adjust to change, and it s normal to feel afraid, frustrated or even angry. These feelings usually go away over time. If your feelings don t start to get better, it s called  adjustment disorder.      What signs should I look out for?  Adjustment disorder can happen to anyone in a time of stress. It makes it hard to cope with daily life. You may feel lonely or fight with loved ones, even if you re glad to be home. Watch for these signs:  Fear or worry  Hard time focusing  Sadness or anger  Trouble talking to family or friends  Feeling like you don t fit in or isolating  yourself  Problems with sleep   Drinking alcohol or taking drugs to cope    What can I do?  You can help yourself get better. Feeling you have control helps you move forward. You may wonder if you ll be able to do things you did before. Be patient. Do your best to make the most of every day. Try to build relationships, be as active as you can, eat right and keep a sense of humor. Avoid smoking and drinking too much alcohol. Call your family doctor or clinic if you re not sure what to do. They can guide you to care or other services.    When should I get help?  Think about getting counseling if your sadness or frustration gets worse. Together with a trained counselor, you can talk about your problems adjusting to life after your hospital stay. You can come up with new ways to handle changes that give you more control. Your family doctor or care team can help you find a counselor.     Get help if you re thinking about hurting yourself. If you need help right away, call 911 or go to the nearest emergency room. You can also try the Crisis Text Line.    Crisis Text Line: 126-821 (http://www.crisistextline.org)  The Crisis Text Line serves anyone, in any crisis. It gives free, 24/7 support. Here's how it works:  Text HOME to 019280 from anywhere in the [place], anytime, about any type of crisis.  A live, trained Crisis Counselor will text you back quickly.  The volunteer Crisis Counselor can help you move from a  hot moment  to a  cool moment.  They can also help you work out a safety plan.

## 2022-10-24 NOTE — PROGRESS NOTES
Leeann is a 62 year old who is being evaluated via a billable telephone visit.      What phone number would you like to be contacted at? listed  How would you like to obtain your AVS? MyChart    Assessment & Plan     Infection due to 2019 novel coronavirus  Reviewed risks, benefits, and possible interactions with Paxlovid. Start antiviral therapy. Continue symptomatic treatments as helpful.  - nirmatrelvir and ritonavir (PAXLOVID) therapy pack; Take 3 tablets by mouth 2 times daily for 5 days (Take 2 Nirmatrelvir tablets and 1 Ritonavir tablet twice daily for 5 days)    Essential hypertension    Hypercholesterolemia  Reviewed interaction between Paxlovid and atorvastatin. She'll hold atorvastatin for 8 days from start of Paxlovid.        No follow-ups on file.    Eric Solorio [facility]   Leeann is a 62 year old, presenting for the following health issues:  No chief complaint on file.      HPI       COVID-19 Symptom Review  How many days ago did these symptoms start? Symptoms started on 10/22 and tested positive 10/24    Are any of the following symptoms significant for you?    New or worsening difficulty breathing? No    Worsening cough? No    Fever or chills? Yes, the highest temperature was 101    Headache: YES    Sore throat: No    Chest pain: No    Diarrhea: No    Body aches? YES      Does patient live in a nursing home, group home, or shelter? No  Does patient have a way to get food/medications during quarantined? Yes, I have a friend or family member who can help me.      Review of Systems   Constitutional, HEENT, cardiovascular, pulmonary, gi and gu systems are negative, except as otherwise noted.      Objective           Vitals:  No vitals were obtained today due to virtual visit.    Physical Exam   healthy, alert and no distress  PSYCH: Alert and oriented times 3; coherent speech, normal   rate and volume, able to articulate logical thoughts, able   to abstract  reason, no tangential thoughts, no hallucinations   or delusions  Her affect is normal  RESP: No cough, no audible wheezing, able to talk in full sentences  Remainder of exam unable to be completed due to telephone visits                Phone call duration: 7 minutes

## 2023-02-26 DIAGNOSIS — K21.9 GASTROESOPHAGEAL REFLUX DISEASE, UNSPECIFIED WHETHER ESOPHAGITIS PRESENT: ICD-10-CM

## 2023-03-13 DIAGNOSIS — F41.9 ANXIETY: ICD-10-CM

## 2023-03-14 RX ORDER — ESCITALOPRAM OXALATE 20 MG/1
TABLET ORAL
Qty: 90 TABLET | Refills: 2 | Status: SHIPPED | OUTPATIENT
Start: 2023-03-14 | End: 2023-12-11

## 2023-04-14 ENCOUNTER — PATIENT OUTREACH (OUTPATIENT)
Dept: GASTROENTEROLOGY | Facility: CLINIC | Age: 63
End: 2023-04-14
Payer: COMMERCIAL

## 2023-04-14 DIAGNOSIS — Z12.11 SPECIAL SCREENING FOR MALIGNANT NEOPLASMS, COLON: Primary | ICD-10-CM

## 2023-04-14 NOTE — PROGRESS NOTES
"Ordering/Referring Provider: Kehr, Kristen M    BMI: Estimated body mass index is 31.11 kg/m  as calculated from the following:    Height as of 9/8/22: 1.607 m (5' 3.25\").    Weight as of 9/8/22: 80.3 kg (177 lb).     Sedation:  Based on patients medical history patient is appropriate for   moderate sedation.   If patient BMI > 50 do not schedule in ASC.    Location:  Does patient have an LVAD?  No    Does patient have a history of asthma, COPD or any other lung disease?  No    Does patient have a history of cardiac disease?  No    Is pataient awaiting a heart or lung transplant?   No    Has patient had a stroke or transient ischemic attack in the last 6 months?   No    Is the patient currently on dialysis?   No    Prep:  Previous prep (last colonoscopy):   Standard Golytely    Quality of previous prep:   Good    Is patient currently on dialysis, is ESRD, or CKD stage 4/5?   No (standard prep)    Does patient have a diagnosis of diabetes?  No    Does patient have a diagnosis of cystic fibrosis (CF)?  No    BMI > 40?  No    Final Referral Status:  meets the criteria for placement of colonoscopy screening  referral order.      Referral order placed with the following recommendations:  Sedation: Moderate Sedation  Location Type: ASC  Prep: Standard Golytely (this was her previous prep)  PAC: No      "

## 2023-06-07 ENCOUNTER — OFFICE VISIT (OUTPATIENT)
Dept: URGENT CARE | Facility: URGENT CARE | Age: 63
End: 2023-06-07
Payer: COMMERCIAL

## 2023-06-07 VITALS
BODY MASS INDEX: 30.58 KG/M2 | DIASTOLIC BLOOD PRESSURE: 77 MMHG | SYSTOLIC BLOOD PRESSURE: 146 MMHG | HEART RATE: 83 BPM | WEIGHT: 174 LBS | OXYGEN SATURATION: 96 % | TEMPERATURE: 98.9 F | RESPIRATION RATE: 14 BRPM

## 2023-06-07 DIAGNOSIS — J02.9 SORE THROAT: Primary | ICD-10-CM

## 2023-06-07 LAB
DEPRECATED S PYO AG THROAT QL EIA: NEGATIVE
GROUP A STREP BY PCR: NOT DETECTED

## 2023-06-07 PROCEDURE — 99213 OFFICE O/P EST LOW 20 MIN: CPT

## 2023-06-07 PROCEDURE — 87651 STREP A DNA AMP PROBE: CPT

## 2023-06-07 NOTE — PATIENT INSTRUCTIONS
Strep test is negative for strep throat.  Get plenty of rest and drink fluids.  Can use Tylenol as needed for pain.  Maximum dose of Tylenol is 3000mg in a 24 hour period of time.  You can also try warm salt water gargles, hot/warm water or tea with honey and/or lemon and/or Cepacol lozenges or spray for your sore throat.

## 2023-06-07 NOTE — PROGRESS NOTES
ASSESSMENT:   (J02.9) Sore throat  (primary encounter diagnosis)  Plan: Streptococcus A Rapid Screen w/Reflex to PCR -         Clinic Collect    PLAN:  Informed the patient that the strep test is negative for strep throat.  We discussed the need to get plenty of rest, drink fluids and use Tylenol as needed for pain with a maximum dose of Tylenol being 3000 mg in a 24-hour period of time.  We also discussed trying warm salt water gargles, hot/warm water or tea with honey and/or lemon and/or Cepacol lozenges or spray for the sore throat.  Discussed the need to return to clinic with any new or worsening symptoms.  Patient acknowledged their understanding of the above plan.    The use of [employer] dictation services may have been used to construct the content in this note; any grammatical or spelling errors are non-intentional. Please contact the author of this note directly if you are in need of any clarification.      Wayne Francis, KIMBERLY CNP      SUBJECTIVE:   Leeann Johnson  is a 62 year old female who is here today because of: Sore Throat.  The patient has had symptoms of headache and fatigue.   Onset of symptoms was 2 days ago. Course of illness is worsening.  Patient admits to exposure to illness at home from her grandchildren.  She states she was exposed last month, went to [place] and purchased amoxicillin.  She took that for five days, felt better and then stopped taking it.  She then developed a sore throat and fatigue two days ago.  Treatment measures tried include none.    ROS:  Negative except noted above.      OBJECTIVE:   BP (!) 146/77   Pulse 83   Temp 98.9  F (37.2  C) (Tympanic)   Resp 14   Wt 78.9 kg (174 lb)   SpO2 96%   BMI 30.58 kg/m    General: healthy, alert and no distress  Eyes - conjunctivae clear.  Ears - External ears normal. Canals clear. TM's normal.  Nose/Sinuses - Nares normal.Mucosa normal. No drainage or sinus tenderness.  Oropharynx - Lips, mucosa, and tongue  normal. Positive findings: probable right sided tonsillar stone  Neck - Neck supple; no lymphadenopathy  Lungs - Lungs clear; no wheezing or rales.  Heart - regular rate and rhythm. No murmurs, rub.    Labs:  Rapid Strep test is negative; await throat culture results.

## 2023-07-14 ENCOUNTER — TELEPHONE (OUTPATIENT)
Dept: GASTROENTEROLOGY | Facility: CLINIC | Age: 63
End: 2023-07-14
Payer: COMMERCIAL

## 2023-07-14 NOTE — TELEPHONE ENCOUNTER
"Endoscopy Scheduling Screen    Have you had a positive Covid test in the last 14 days?  No    Are you active on MyChart?   Yes    What insurance is in the chart?  Other:  [facility]    Ordering/Referring Provider: Kehr, Kristen M, PA-C   (If ordering provider performs procedure, schedule with ordering provider unless otherwise instructed. )    BMI: Estimated body mass index is 30.58 kg/m  as calculated from the following:    Height as of 9/8/22: 1.607 m (5' 3.25\").    Weight as of 6/7/23: 78.9 kg (174 lb).     Sedation Ordered  moderate sedation.   If patient BMI > 50 do not schedule in ASC.    Are you taking any prescription medications for pain?   No    Are you taking methadone or Suboxone?  No    Do you have a history of malignant hyperthermia or adverse reaction to anesthesia?  No    (Females) Are you currently pregnant?   No     Have you been diagnosed or told you have pulmonary hypertension?   No    Do you have an LVAD?  No    Have you been told you have moderate to severe sleep apnea?  No    Have you been told you have COPD, asthma, or any other lung disease?  No    Do you have any heart conditions?  No     Have you ever had or are you awaiting a heart or lung transplant?   No    Have you had a stroke or transient ischemic attack (TIA aka \"mini stroke\" in the last 6 months?   No    Have you been diagnosed with or been told you have cirrhosis of the liver?   No    Are you currently on dialysis?   No    Do you need assistance transferring?   No    BMI: Estimated body mass index is 30.58 kg/m  as calculated from the following:    Height as of 9/8/22: 1.607 m (5' 3.25\").    Weight as of 6/7/23: 78.9 kg (174 lb).     Is patients BMI > 40 and scheduling location [facility]?  No    Do you take the medication Phentermine, Ozempic or Wegovy?  No    Do you take the medication Naltrexone?  No    Do you take blood thinners?  No      Prep   Are you currently on dialysis or do you have chronic kidney disease?  No    Do you have a " diagnosis of diabetes?  No    Do you have a diagnosis of cystic fibrosis (CF)?  No    On a regular basis do you go 3 -5 days between bowel movements?  No    BMI > 40?  No    Preferred Pharmacy:    [id number] - [place], [place] - [address]  [address]  [place] [address]  Phone: [phone] Fax: [phone]        Final Scheduling Details   Colonoscopy prep sent?  Standard Golytely    Procedure scheduled  Colonoscopy    Surgeon:  XANDER     Date of procedure:  9/14/23     Schedule PAC:   No    Location  [facility] - ASC    Sedation   Moderate Sedation    Patient Reminders:    You will receive a call from a Nurse to review instructions and health history.  This assessment must be completed prior to your procedure.  Failure to complete the Nurse assessment may result in the procedure being cancelled.       On the day of your procedure, please designate an adult(s) who can drive you home stay with you for the next 24 hours. The medicines used in the exam will make you sleepy. You will not be able to drive.       You cannot take public transportation, ride share services, or non-medical taxi service without a responsible caregiver.  Medical transport services are allowed with the requirement that a responsible caregiver will receive you at your destination.  We require that drivers and caregivers are confirmed prior to your procedure.

## 2023-08-29 ENCOUNTER — TELEPHONE (OUTPATIENT)
Dept: GASTROENTEROLOGY | Facility: CLINIC | Age: 63
End: 2023-08-29

## 2023-08-29 DIAGNOSIS — Z12.11 SPECIAL SCREENING FOR MALIGNANT NEOPLASMS, COLON: Primary | ICD-10-CM

## 2023-08-29 RX ORDER — BISACODYL 5 MG/1
TABLET, DELAYED RELEASE ORAL
Qty: 4 TABLET | Refills: 0 | Status: SHIPPED | OUTPATIENT
Start: 2023-08-29 | End: 2023-11-02

## 2023-08-29 NOTE — TELEPHONE ENCOUNTER
Pre visit planning completed.      Procedure details:    Patient scheduled for Colonoscopy  on 09.14.2023.     Arrival time: 0945. Procedure time 1030    Pre op exam needed? N/A    Facility location: [facility]; [address], [place] [address]    Sedation type: Conscious sedation     Indication for procedure: Special screening for malignant neoplasms, colon       Chart review:     Electronic implanted devices? No    Diabetic? No    Diabetic medication HOLDING recommendations: (if applicable)  Oral diabetic medications: N/A  Diabetic injectables: N/A  Insulin: N/A      Medication review:    Anticoagulants? No    NSAIDS? No NSAID medications per patient's medication list.  RN will verify with pre-assessment call.    Other medication HOLDING recommendations:  N/A      Prep for procedure:     Bowel prep recommendation: Standard Golytely    Due to:   CRC wrote on order    Prep instructions sent via [employer] Bowel prep script sent to    [id number] - [place] - [address]          Valorie Escobedo RN  Endoscopy Procedure Pre Assessment RN  [phone] option 4

## 2023-08-30 NOTE — TELEPHONE ENCOUNTER
Caller: Leeann Johnson   Reason for Reschedule/Cancellation (please be detailed, any staff messages or encounters to note?):     PER PT -- RESCHEDULE       Prior to reschedule please review:  Ordering Provider:Kehr, Kristen M, PA-C    Sedation per order:MODERATE   Does patient have any ASC Exclusions, please identify?: N       Notes on Cancelled Procedure:  Procedure:Lower Endoscopy [Colonoscopy]   Date: 09/14/2023  Location:[facility]; [address], [place] [address]  Surgeon: XANDER         Rescheduled: YES   Procedure: Lower Endoscopy [Colonoscopy]  Date: 10/17/2023  Location:[facility]; [address], [place] [address]  Surgeon: HAWA   Sedation Level Scheduled  MODERATE          Reason for Sedation Level PER ORDER   Prep/Instructions updated and sent: [employer]        Send In - basket message to [facility] if EUS procedure is canceled or rescheduled: [ N/A, YES or NO] N/A

## 2023-08-30 NOTE — TELEPHONE ENCOUNTER
Attempted to contact patient in order to complete pre assessment questions.     No answer. Left message to return call to [phone] option 4      Valorie Sahni RN  Endoscopy Procedure Pre Assessment RN

## 2023-09-10 DIAGNOSIS — I10 ESSENTIAL HYPERTENSION: ICD-10-CM

## 2023-09-11 RX ORDER — AMLODIPINE BESYLATE 10 MG/1
TABLET ORAL
Qty: 90 TABLET | Refills: 0 | Status: SHIPPED | OUTPATIENT
Start: 2023-09-11 | End: 2023-12-11

## 2023-10-02 NOTE — TELEPHONE ENCOUNTER
Rescheduled Colonoscopy on 10.17.2023     Pre assessment completed for upcoming procedure.      Procedure details:    Patient scheduled for Colonoscopy  on 10.17.2023.     Arrival time: 0945. Procedure time 1030    Pre op exam needed? N/A    Facility location: [facility]; [address], [place] [address]    Sedation type: Conscious sedation     Indication for procedure:  Special screening for malignant neoplasms, colon     COVID policy reviewed.    Designated  policy reviewed. Instructed to have someone stay 6 hours post procedure.       Chart review:     Electronic implanted devices? No    Diabetic? No    Diabetic medication HOLDING recommendations: (if applicable)  Oral diabetic medications: N/A  Diabetic injectables: N/A  Insulin: N/A    Medication review:    Anticoagulants? No    NSAIDS? No    Other medication HOLDING recommendations:  N/A      Prep for procedure:     Bowel prep recommendation: Miralax without magnesium citrate-Patient did not want golytely which was previously sent  Due to: standard bowel prep.    Prep instructions sent via [employer]     Reviewed procedure prep instructions.     Patient verbalized understanding and had no questions or concerns at this time.        Valorie Escobedo RN  Endoscopy Procedure Pre Assessment RN  [phone] option 4

## 2023-10-05 ENCOUNTER — ANCILLARY PROCEDURE (OUTPATIENT)
Dept: MAMMOGRAPHY | Facility: CLINIC | Age: 63
End: 2023-10-05
Attending: PHYSICIAN ASSISTANT
Payer: COMMERCIAL

## 2023-10-05 DIAGNOSIS — Z12.31 VISIT FOR SCREENING MAMMOGRAM: ICD-10-CM

## 2023-10-05 PROCEDURE — 77067 SCR MAMMO BI INCL CAD: CPT | Mod: TC | Performed by: RADIOLOGY

## 2023-10-05 PROCEDURE — 77063 BREAST TOMOSYNTHESIS BI: CPT | Mod: TC | Performed by: RADIOLOGY

## 2023-10-13 ENCOUNTER — MYC MEDICAL ADVICE (OUTPATIENT)
Dept: FAMILY MEDICINE | Facility: CLINIC | Age: 63
End: 2023-10-13
Payer: COMMERCIAL

## 2023-10-13 NOTE — TELEPHONE ENCOUNTER
Routing to PCP to advise if patient should do anything further until her colonoscopy in 4 days.     Noemy Reyez BSN, RN

## 2023-10-16 NOTE — TELEPHONE ENCOUNTER
Using the symptomatic treatments with hemorrhoid products over the counter for now.   If there are acute changes with increase in bleeding, fever, feeling ill, she should be seen in UC, otherwise, she is doing the correct things with the OTC products for hemorrhoids.   Kristen Kehr PA-C

## 2023-10-17 ENCOUNTER — HOSPITAL ENCOUNTER (OUTPATIENT)
Facility: AMBULATORY SURGERY CENTER | Age: 63
Discharge: HOME | End: 2023-10-17
Attending: SPECIALIST | Admitting: SPECIALIST
Payer: COMMERCIAL

## 2023-10-17 VITALS
HEART RATE: 68 BPM | DIASTOLIC BLOOD PRESSURE: 64 MMHG | SYSTOLIC BLOOD PRESSURE: 106 MMHG | OXYGEN SATURATION: 94 % | RESPIRATION RATE: 16 BRPM | TEMPERATURE: 98 F

## 2023-10-17 LAB — COLONOSCOPY: NORMAL

## 2023-10-17 PROCEDURE — G8918 PT W/O PREOP ORDER IV AB PRO: HCPCS

## 2023-10-17 PROCEDURE — 88305 TISSUE EXAM BY PATHOLOGIST: CPT | Performed by: PATHOLOGY

## 2023-10-17 PROCEDURE — G8907 PT DOC NO EVENTS ON DISCHARG: HCPCS

## 2023-10-17 PROCEDURE — 45385 COLONOSCOPY W/LESION REMOVAL: CPT | Mod: PT

## 2023-10-17 RX ORDER — LIDOCAINE 40 MG/G
CREAM TOPICAL
Status: DISCONTINUED | OUTPATIENT
Start: 2023-10-17 | End: 2023-10-18 | Stop reason: HOSPADM

## 2023-10-17 RX ORDER — LIDOCAINE HYDROCHLORIDE 20 MG/ML
JELLY TOPICAL PRN
Status: DISCONTINUED | OUTPATIENT
Start: 2023-10-17 | End: 2023-10-17 | Stop reason: HOSPADM

## 2023-10-17 RX ORDER — ONDANSETRON 2 MG/ML
4 INJECTION INTRAMUSCULAR; INTRAVENOUS
Status: DISCONTINUED | OUTPATIENT
Start: 2023-10-17 | End: 2023-10-18 | Stop reason: HOSPADM

## 2023-10-17 RX ORDER — FENTANYL CITRATE 50 UG/ML
INJECTION, SOLUTION INTRAMUSCULAR; INTRAVENOUS PRN
Status: DISCONTINUED | OUTPATIENT
Start: 2023-10-17 | End: 2023-10-17 | Stop reason: HOSPADM

## 2023-10-17 NOTE — H&P
Pre-Endoscopy History and Physical     Leeann Johnson MRN# [id number]   YOB: 1960 Age: 63 year old     Date of Procedure: 10/17/2023  Primary care provider: Kehr, Kristen M  Type of Endoscopy: colonoscopy  Reason for Procedure: surveillance - history of colon polyps in   Type of Anesthesia Anticipated: Moderate sedation    HPI:    Leeann is a 63 year old female who will be undergoing the above procedure.      A history and physical has been performed. The patient's medications and allergies have been reviewed. The risks and benefits of the procedure and the sedation options and risks were discussed with the patient.  All questions were answered and informed consent was obtained.      She denies a personal or family history of anesthesia complications or bleeding disorders.     No Known Allergies     Current Outpatient Medications   Medication    amLODIPine (NORVASC) 10 MG tablet    aspirin (ASA) 81 MG tablet    atorvastatin (LIPITOR) 40 MG tablet    bisacodyl (DULCOLAX) 5 MG EC tablet    escitalopram (LEXAPRO) 20 MG tablet    gemfibrozil (LOPID) 600 MG tablet    lisinopril (ZESTRIL) 40 MG tablet    magnesium 250 MG tablet    Omega-3 Fatty Acids (FISH OIL OMEGA-3 PO)    omeprazole (PRILOSEC) 20 MG DR capsule    polyethylene glycol (GOLYTELY) 236 g suspension    vitamin B-Complex    VITAMIN D PO     No current facility-administered medications for this encounter.       Patient Active Problem List   Diagnosis    IGT (impaired glucose tolerance)    Essential hypertension    Hypercholesterolemia    Gastroesophageal reflux disease, esophagitis presence not specified        Past Medical History:   Diagnosis Date    Hypertension         Past Surgical History:   Procedure Laterality Date    BUNIONECTOMY Left 2009     SECTION      COLONOSCOPY  -    COLONOSCOPY WITH CO2 INSUFFLATION N/A 2020    Procedure: COLONOSCOPY, WITH CO2 INSUFFLATION;  Surgeon: Yimi Nettles MD;   "Location: [facility] OR    GYN SURGERY      Tubular    TUBAL LIGATION         Social History     Tobacco Use    Smoking status: Former     Packs/day: 1.00     Years: 30.00     Additional pack years: 0.00     Total pack years: 30.00     Types: Cigarettes     Start date: 5/3/1974     Quit date: 5/3/2005     Years since quittin.4    Smokeless tobacco: Never   Substance Use Topics    Alcohol use: Yes     Comment: 3 drinks /day       Family History   Problem Relation Age of Onset    Diabetes Mother     Hypertension Mother     Hyperlipidemia Mother     Depression Mother     Coronary Artery Disease Father     Hypertension Father     Hyperlipidemia Father     Coronary Artery Disease Maternal Grandmother     Coronary Artery Disease Maternal Grandfather        REVIEW OF SYSTEMS:   5 point ROS negative except as noted above in HPI, including Gen., Resp., CV, GI &  system review.    PHYSICAL EXAM:   /77   Temp 97.9  F (36.6  C) (Temporal)   Resp 16   SpO2 94%  Estimated body mass index is 30.58 kg/m  as calculated from the following:    Height as of 22: 1.607 m (5' 3.25\").    Weight as of 23: 78.9 kg (174 lb).   GENERAL APPEARANCE: healthy and over weight  MENTAL STATUS: alert or interactive  AIRWAY EXAM: Mallampatti Class III (visualization of the soft palate and base of uvula)  RESP: lungs clear to auscultation - no rales, rhonchi or wheezes  CV: regular rates and rhythm, normal S1 S2, no S3 or S4, and no murmur, click or rub    DIAGNOSTICS:    Not indicated    IMPRESSION   ASA Class 2 - Mild systemic disease    PLAN:   Colonoscopy    The above has been forwarded to the consulting provider.      Signed Electronically by: Dennis Escobedo MD  2023        "

## 2023-10-19 LAB
PATH REPORT.COMMENTS IMP SPEC: NORMAL
PATH REPORT.COMMENTS IMP SPEC: NORMAL
PATH REPORT.FINAL DX SPEC: NORMAL
PATH REPORT.GROSS SPEC: NORMAL
PATH REPORT.MICROSCOPIC SPEC OTHER STN: NORMAL
PATH REPORT.RELEVANT HX SPEC: NORMAL
PHOTO IMAGE: NORMAL

## 2023-10-26 ASSESSMENT — ENCOUNTER SYMPTOMS
NERVOUS/ANXIOUS: 0
PARESTHESIAS: 0
SORE THROAT: 0
DIARRHEA: 1
ARTHRALGIAS: 0
SHORTNESS OF BREATH: 0
MYALGIAS: 0
HEMATOCHEZIA: 1
BREAST MASS: 0
DIZZINESS: 0
ABDOMINAL PAIN: 0
WEAKNESS: 0
CONSTIPATION: 0
PALPITATIONS: 0
COUGH: 0
HEADACHES: 0
FEVER: 0
EYE PAIN: 0
HEMATURIA: 0
JOINT SWELLING: 0
CHILLS: 0
FREQUENCY: 0
NAUSEA: 0
HEARTBURN: 0
DYSURIA: 0

## 2023-11-02 ENCOUNTER — OFFICE VISIT (OUTPATIENT)
Dept: FAMILY MEDICINE | Facility: CLINIC | Age: 63
End: 2023-11-02
Payer: COMMERCIAL

## 2023-11-02 VITALS
OXYGEN SATURATION: 96 % | DIASTOLIC BLOOD PRESSURE: 84 MMHG | HEIGHT: 64 IN | WEIGHT: 174 LBS | TEMPERATURE: 97.7 F | SYSTOLIC BLOOD PRESSURE: 132 MMHG | RESPIRATION RATE: 16 BRPM | BODY MASS INDEX: 29.71 KG/M2 | HEART RATE: 76 BPM

## 2023-11-02 DIAGNOSIS — I10 ESSENTIAL HYPERTENSION: ICD-10-CM

## 2023-11-02 DIAGNOSIS — R73.03 PREDIABETES: ICD-10-CM

## 2023-11-02 DIAGNOSIS — Z00.00 ROUTINE GENERAL MEDICAL EXAMINATION AT A HEALTH CARE FACILITY: Primary | ICD-10-CM

## 2023-11-02 DIAGNOSIS — E78.00 HYPERCHOLESTEROLEMIA: ICD-10-CM

## 2023-11-02 DIAGNOSIS — Z23 NEED FOR PROPHYLACTIC VACCINATION AND INOCULATION AGAINST INFLUENZA: ICD-10-CM

## 2023-11-02 DIAGNOSIS — K64.9 HEMORRHOIDS, UNSPECIFIED HEMORRHOID TYPE: ICD-10-CM

## 2023-11-02 LAB
ALBUMIN SERPL BCG-MCNC: 4.6 G/DL (ref 3.5–5.2)
ALP SERPL-CCNC: 76 U/L (ref 35–104)
ALT SERPL W P-5'-P-CCNC: 31 U/L (ref 0–50)
ANION GAP SERPL CALCULATED.3IONS-SCNC: 13 MMOL/L (ref 7–15)
AST SERPL W P-5'-P-CCNC: 28 U/L (ref 0–45)
BILIRUB SERPL-MCNC: 0.3 MG/DL
BUN SERPL-MCNC: 19.3 MG/DL (ref 8–23)
CALCIUM SERPL-MCNC: 10.2 MG/DL (ref 8.8–10.2)
CHLORIDE SERPL-SCNC: 104 MMOL/L (ref 98–107)
CHOLEST SERPL-MCNC: 235 MG/DL
CREAT SERPL-MCNC: 0.63 MG/DL (ref 0.51–0.95)
DEPRECATED HCO3 PLAS-SCNC: 22 MMOL/L (ref 22–29)
EGFRCR SERPLBLD CKD-EPI 2021: >90 ML/MIN/1.73M2
GLUCOSE SERPL-MCNC: 109 MG/DL (ref 70–99)
HBA1C MFR BLD: 5.4 % (ref 0–5.6)
HDLC SERPL-MCNC: 58 MG/DL
LDLC SERPL CALC-MCNC: 121 MG/DL
NONHDLC SERPL-MCNC: 177 MG/DL
POTASSIUM SERPL-SCNC: 4.7 MMOL/L (ref 3.4–5.3)
PROT SERPL-MCNC: 7.6 G/DL (ref 6.4–8.3)
SODIUM SERPL-SCNC: 139 MMOL/L (ref 135–145)
TRIGL SERPL-MCNC: 280 MG/DL

## 2023-11-02 PROCEDURE — 80053 COMPREHEN METABOLIC PANEL: CPT | Performed by: PHYSICIAN ASSISTANT

## 2023-11-02 PROCEDURE — 83036 HEMOGLOBIN GLYCOSYLATED A1C: CPT | Performed by: PHYSICIAN ASSISTANT

## 2023-11-02 PROCEDURE — 80061 LIPID PANEL: CPT | Performed by: PHYSICIAN ASSISTANT

## 2023-11-02 PROCEDURE — 90471 IMMUNIZATION ADMIN: CPT | Performed by: PHYSICIAN ASSISTANT

## 2023-11-02 PROCEDURE — 90686 IIV4 VACC NO PRSV 0.5 ML IM: CPT | Performed by: PHYSICIAN ASSISTANT

## 2023-11-02 PROCEDURE — 99396 PREV VISIT EST AGE 40-64: CPT | Mod: 25 | Performed by: PHYSICIAN ASSISTANT

## 2023-11-02 PROCEDURE — 36415 COLL VENOUS BLD VENIPUNCTURE: CPT | Performed by: PHYSICIAN ASSISTANT

## 2023-11-02 PROCEDURE — 99214 OFFICE O/P EST MOD 30 MIN: CPT | Mod: 25 | Performed by: PHYSICIAN ASSISTANT

## 2023-11-02 ASSESSMENT — ENCOUNTER SYMPTOMS
CONSTIPATION: 0
NERVOUS/ANXIOUS: 0
NAUSEA: 0
CHILLS: 0
FREQUENCY: 0
DIARRHEA: 1
MYALGIAS: 0
HEADACHES: 0
HEARTBURN: 0
PARESTHESIAS: 0
FEVER: 0
ABDOMINAL PAIN: 0
HEMATURIA: 0
HEMATOCHEZIA: 1
SHORTNESS OF BREATH: 0
COUGH: 0
DYSURIA: 0
PALPITATIONS: 0
SORE THROAT: 0
EYE PAIN: 0
WEAKNESS: 0
DIZZINESS: 0
JOINT SWELLING: 0
BREAST MASS: 0
ARTHRALGIAS: 0

## 2023-11-02 ASSESSMENT — PAIN SCALES - GENERAL: PAINLEVEL: NO PAIN (0)

## 2023-11-02 NOTE — PROGRESS NOTES
SUBJECTIVE:   CC: Leeann is an 63 year old who presents for preventive health visit.       2023    10:10 AM   Additional Questions   Roomed by Ja RON MA       Healthy Habits:     Getting at least 3 servings of Calcium per day:  Yes    Bi-annual eye exam:  NO    Dental care twice a year:  NO    Sleep apnea or symptoms of sleep apnea:  None    Diet:  Regular (no restrictions)    Frequency of exercise:  None    Taking medications regularly:  Yes    Medication side effects:  None, No muscle aches and No significant flushing    Additional concerns today:  No      Today's PHQ-2 Score:       2023     9:59 AM   PHQ-2 (  [employer])   Q1: Little interest or pleasure in doing things 0   Q2: Feeling down, depressed or hopeless 0   PHQ-2 Score 0   Q1: Little interest or pleasure in doing things Not at all   Q2: Feeling down, depressed or hopeless Not at all   PHQ-2 Score 0         PROBLEMS TO ADD ON...  Hypertension: managed with lisinopril, amlodipine.   Hyperlipidemia: managed with atorvastatin  Prediabetes: goal to work on lifestyle modifications.     Social History     Tobacco Use    Smoking status: Former     Packs/day: 1.00     Years: 30.00     Additional pack years: 0.00     Total pack years: 30.00     Types: Cigarettes     Start date: 5/3/1974     Quit date: 5/3/2005     Years since quittin.5    Smokeless tobacco: Never   Substance Use Topics    Alcohol use: Yes     Comment: 3 drinks /day             10/26/2023    10:10 AM   Alcohol Use   Prescreen: >3 drinks/day or >7 drinks/week? No         2022     9:46 AM   AUDIT - Alcohol Use Disorders Identification Test - Reproduced from the World Health Organization Audit 2001 (Second Edition)   1.  How often do you have a drink containing alcohol? 4 or more times a week   2.  How many drinks containing alcohol do you have on a typical day when you are drinking? 3 or 4   3.  How often do you have five or more drinks on one occasion? Weekly   4.  How often  during the last year have you found that you were not able to stop drinking once you had started? Never   5.  How often during the last year have you failed to do what was normally expected of you because of drinking? Never   6.  How often during the last year have you needed a first drink in the morning to get yourself going after a heavy drinking session? Never   7.  How often during the last year have you had a feeling of guilt or remorse after drinking? Never   8.  How often during the last year have you been unable to remember what happened the night before because of your drinking? Never   9.  Have you or someone else been injured because of your drinking? No   10. Has a relative, friend, doctor or other health care worker been concerned about your drinking or suggested you cut down? No   TOTAL SCORE 8     Reviewed orders with patient.  Reviewed health maintenance and updated orders accordingly - Yes  BP Readings from Last 3 Encounters:   23 132/84   10/17/23 106/64   23 (!) 146/77    Wt Readings from Last 3 Encounters:   23 78.9 kg (174 lb)   23 78.9 kg (174 lb)   22 80.3 kg (177 lb)                  Patient Active Problem List   Diagnosis    IGT (impaired glucose tolerance)    Essential hypertension    Hypercholesterolemia    Gastroesophageal reflux disease, esophagitis presence not specified     Past Surgical History:   Procedure Laterality Date    BUNIONECTOMY Left 2009     SECTION      COLONOSCOPY  -    COLONOSCOPY N/A 10/17/2023    Procedure: COLONOSCOPY, FLEXIBLE, WITH LESION REMOVAL USING SNARE;  Surgeon: Dennis Escobedo MD;  Location: [facility]    COLONOSCOPY WITH CO2 INSUFFLATION N/A 2020    Procedure: COLONOSCOPY, WITH CO2 INSUFFLATION;  Surgeon: Yimi Nettles MD;  Location: [facility]    COLONOSCOPY WITH CO2 INSUFFLATION N/A 10/17/2023    Procedure: Colonoscopy with CO2 insufflation;  Surgeon: Dennis Escobedo MD;  Location:  OR    GYN SURGERY       Tubular    TUBAL LIGATION         Social History     Tobacco Use    Smoking status: Former     Packs/day: 1.00     Years: 30.00     Additional pack years: 0.00     Total pack years: 30.00     Types: Cigarettes     Start date: 5/3/1974     Quit date: 5/3/2005     Years since quittin.5    Smokeless tobacco: Never   Substance Use Topics    Alcohol use: Yes     Comment: 3 drinks /day     Family History   Problem Relation Age of Onset    Diabetes Mother     Hypertension Mother     Hyperlipidemia Mother     Depression Mother     Coronary Artery Disease Father     Hypertension Father     Hyperlipidemia Father     Coronary Artery Disease Maternal Grandmother     Coronary Artery Disease Maternal Grandfather          Current Outpatient Medications   Medication Sig Dispense Refill    amLODIPine (NORVASC) 10 MG tablet TAKE 1 TABLET DAILY 90 tablet 0    aspirin (ASA) 81 MG tablet Take 1 tablet (81 mg) by mouth daily      atorvastatin (LIPITOR) 40 MG tablet TAKE 1 TABLET AT BEDTIME (LAST REFILL, PLEASE SCHEDULE APPOINTMENT AND FASTING LABWORK) 90 tablet 3    escitalopram (LEXAPRO) 20 MG tablet TAKE 1 TABLET DAILY 90 tablet 2    gemfibrozil (LOPID) 600 MG tablet TAKE 1 TABLET TWICE A DAY BEFORE MEALS 180 tablet 3    lisinopril (ZESTRIL) 40 MG tablet TAKE 1 TABLET DAILY 90 tablet 3    magnesium 250 MG tablet Take 1 tablet by mouth daily      Omega-3 Fatty Acids (FISH OIL OMEGA-3 PO)       omeprazole (PRILOSEC) 20 MG DR capsule TAKE 1 CAPSULE DAILY 90 capsule 2    vitamin B-Complex Take 1 tablet by mouth daily      VITAMIN D PO        No Known Allergies  Recent Labs   Lab Test 23  1051 22  1050 21  1258 20  1019 10/30/19  0000 19  0000   A1C 5.4  --   --   --   --  5.2   LDL  --  156* 134* 122*   < >  --    HDL  --  73 80 69   < >  --    TRIG  --  483* 341* 238*   < >  --    ALT  --  27 33 29  --   --    CR  --  0.55 0.72 0.54   < >  --    GFRESTIMATED  --  >90 >90 >90   < >  --     GFRESTBLACK  --   --  >90 >90   < >  --    POTASSIUM  --  4.4 4.2 4.7   < >  --     < > = values in this interval not displayed.        Breast Cancer Screenin/25/2021    11:15 AM   Breast CA Risk Assessment (FHS-7)   Do you have a family history of breast, colon, or ovarian cancer? No / Unknown         Mammogram Screening: Recommended mammography every 1-2 years with patient discussion and risk factor consideration  Pertinent mammograms are reviewed under the imaging tab.    History of abnormal Pap smear: NO - age 30-65 PAP every 5 years with negative HPV co-testing recommended  Last 3 Pap and HPV Results:       Reviewed and updated as needed this visit by clinical staff   Tobacco  Allergies  Meds              Reviewed and updated as needed this visit by Provider                 Past Medical History:   Diagnosis Date    Hypertension       Past Surgical History:   Procedure Laterality Date    BUNIONECTOMY Left 2009     SECTION      COLONOSCOPY  -    COLONOSCOPY N/A 10/17/2023    Procedure: COLONOSCOPY, FLEXIBLE, WITH LESION REMOVAL USING SNARE;  Surgeon: Dennis Escobedo MD;  Location: [facility]    COLONOSCOPY WITH CO2 INSUFFLATION N/A 2020    Procedure: COLONOSCOPY, WITH CO2 INSUFFLATION;  Surgeon: Yimi Nettles MD;  Location: [facility]    COLONOSCOPY WITH CO2 INSUFFLATION N/A 10/17/2023    Procedure: Colonoscopy with CO2 insufflation;  Surgeon: Dennis Escobedo MD;  Location: [facility]    GYN SURGERY      Tubular    TUBAL LIGATION       OB History   No obstetric history on file.       Review of Systems   Constitutional:  Negative for chills and fever.   HENT:  Negative for congestion, ear pain, hearing loss and sore throat.    Eyes:  Negative for pain and visual disturbance.   Respiratory:  Negative for cough and shortness of breath.    Cardiovascular:  Negative for chest pain, palpitations and peripheral edema.   Gastrointestinal:  Positive for diarrhea and  "hematochezia. Negative for abdominal pain, constipation, heartburn and nausea.   Breasts:  Negative for tenderness, breast mass and discharge.   Genitourinary:  Negative for dysuria, frequency, genital sores, hematuria, pelvic pain, urgency, vaginal bleeding and vaginal discharge.   Musculoskeletal:  Negative for arthralgias, joint swelling and myalgias.   Skin:  Negative for rash.   Neurological:  Negative for dizziness, weakness, headaches and paresthesias.   Psychiatric/Behavioral:  Negative for mood changes. The patient is not nervous/anxious.           OBJECTIVE:   /84   Pulse 76   Temp 97.7  F (36.5  C) (Tympanic)   Resp 16   Ht 1.613 m (5' 3.5\")   Wt 78.9 kg (174 lb)   LMP  (LMP Unknown)   SpO2 96%   Breastfeeding No   BMI 30.34 kg/m    Physical Exam  GENERAL APPEARANCE: healthy, alert and no distress  EYES: Eyes grossly normal to inspection, PERRL and conjunctivae and sclerae normal  HENT: ear canals and TM's normal, nose and mouth without ulcers or lesions, oropharynx clear and oral mucous membranes moist  NECK: no adenopathy, no asymmetry, masses, or scars and thyroid normal to palpation  RESP: lungs clear to auscultation - no rales, rhonchi or wheezes  BREAST: normal without masses, tenderness or nipple discharge and no palpable axillary masses or adenopathy  CV: regular rate and rhythm, normal S1 S2, no S3 or S4, no murmur, click or rub, no peripheral edema and peripheral pulses strong  ABDOMEN: soft, nontender, no hepatosplenomegaly, no masses and bowel sounds normal  MS: no musculoskeletal defects are noted and gait is age appropriate without ataxia  SKIN: no suspicious lesions or rashes  NEURO: Normal strength and tone, sensory exam grossly normal, mentation intact and speech normal  PSYCH: mentation appears normal and affect normal/bright    Diagnostic Test Results:  Labs reviewed in Epic    ASSESSMENT/PLAN:   (Z00.00) Routine general medical examination at a health care facility  " (primary encounter diagnosis)  Comment: Health maintenance reviewed and updated.  Defers some vaccinations.     (I10) Essential hypertension  Stable, she does not need refills of the lisinopril and amlodipine at this time.   She will contact the clinic when needed.   Will check lab tests today  Plan: COMPREHENSIVE METABOLIC PANEL            (E78.00) Hypercholesterolemia  Will check lipid panel.   She will continue with the atorvastatin   Work on lifestyle changes for triglycerides.   Plan: Lipid panel reflex to direct LDL Non-fasting            (R73.03) Prediabetes  Comment: lifestyle modifications.   Discussed metformin. She already has IBS with loose stool and hesitant to try. Encouraged healthy changes.   Plan: Hemoglobin A1c         (K64.9) Hemorrhoid  Recent bleeding from new hemorrhoid. Found also on recent colonoscopy. She did see Colorectal Surgery with confirmation of the hemorrhoid and no abscess or mass present. She will have some bleeding off and on, but has had significant improvement.  She is using OTC products    (Z23) Need for prophylactic vaccination and inoculation against influenza          COUNSELING:  Reviewed preventive health counseling, as reflected in patient instructions       Regular exercise       Healthy diet/nutrition        She reports that she quit smoking about 18 years ago. Her smoking use included cigarettes. She started smoking about 49 years ago. She has a 30.00 pack-year smoking history. She has never used smokeless tobacco.          Kristen M. Kehr, PA-C [facility]

## 2023-11-13 ENCOUNTER — MYC MEDICAL ADVICE (OUTPATIENT)
Dept: FAMILY MEDICINE | Facility: CLINIC | Age: 63
End: 2023-11-13
Payer: COMMERCIAL

## 2023-11-13 DIAGNOSIS — E78.00 HYPERCHOLESTEROLEMIA: ICD-10-CM

## 2023-11-13 RX ORDER — GEMFIBROZIL 600 MG/1
TABLET, FILM COATED ORAL
Qty: 180 TABLET | Refills: 3 | Status: SHIPPED | OUTPATIENT
Start: 2023-11-13 | End: 2024-09-05

## 2023-11-22 DIAGNOSIS — K21.9 GASTROESOPHAGEAL REFLUX DISEASE, UNSPECIFIED WHETHER ESOPHAGITIS PRESENT: ICD-10-CM

## 2023-12-10 DIAGNOSIS — I10 ESSENTIAL HYPERTENSION: ICD-10-CM

## 2023-12-10 DIAGNOSIS — F41.9 ANXIETY: ICD-10-CM

## 2023-12-11 RX ORDER — AMLODIPINE BESYLATE 10 MG/1
TABLET ORAL
Qty: 90 TABLET | Refills: 0 | Status: SHIPPED | OUTPATIENT
Start: 2023-12-11

## 2023-12-11 RX ORDER — ESCITALOPRAM OXALATE 20 MG/1
TABLET ORAL
Qty: 90 TABLET | Refills: 0 | Status: SHIPPED | OUTPATIENT
Start: 2023-12-11 | End: 2024-01-26

## 2023-12-19 ENCOUNTER — MYC MEDICAL ADVICE (OUTPATIENT)
Dept: FAMILY MEDICINE | Facility: CLINIC | Age: 63
End: 2023-12-19
Payer: COMMERCIAL

## 2023-12-19 DIAGNOSIS — I10 ESSENTIAL HYPERTENSION: ICD-10-CM

## 2023-12-19 RX ORDER — LISINOPRIL 40 MG/1
40 TABLET ORAL DAILY
Qty: 90 TABLET | Refills: 3 | Status: SHIPPED | OUTPATIENT
Start: 2023-12-19

## 2024-01-04 ENCOUNTER — MYC MEDICAL ADVICE (OUTPATIENT)
Dept: FAMILY MEDICINE | Facility: CLINIC | Age: 64
End: 2024-01-04
Payer: COMMERCIAL

## 2024-01-04 DIAGNOSIS — E78.00 HYPERCHOLESTEROLEMIA: ICD-10-CM

## 2024-01-05 RX ORDER — ATORVASTATIN CALCIUM 40 MG/1
TABLET, FILM COATED ORAL
Qty: 90 TABLET | Refills: 3 | Status: SHIPPED | OUTPATIENT
Start: 2024-01-05

## 2024-01-25 DIAGNOSIS — F41.9 ANXIETY: ICD-10-CM

## 2024-01-25 RX ORDER — ESCITALOPRAM OXALATE 20 MG/1
20 TABLET ORAL DAILY
Qty: 90 TABLET | Refills: 0 | Status: CANCELLED | OUTPATIENT
Start: 2024-01-25

## 2024-01-26 RX ORDER — ESCITALOPRAM OXALATE 20 MG/1
20 TABLET ORAL DAILY
Qty: 90 TABLET | Refills: 2 | Status: SHIPPED | OUTPATIENT
Start: 2024-01-26 | End: 2024-10-07

## 2024-04-01 ENCOUNTER — TELEPHONE (OUTPATIENT)
Dept: FAMILY MEDICINE | Facility: CLINIC | Age: 64
End: 2024-04-01
Payer: COMMERCIAL

## 2024-04-01 NOTE — TELEPHONE ENCOUNTER
Incoming call from Yasmin Grant, from [facility].    Yasmin Grant, states that a drug interaction is noted with the combination of gemfibrozil and atorvastatin, and was unable to find documentation at the time of previous dispense to acknowledge that provider is aware of the potential interaction.    Per chart review, provider acknowledged the potential interaction and approved the interaction for reason: tolerated medication/side effects in past. RN relayed the provider override from 1/5/24 to Yasmin Grant. Yasmin Grant, indicates understanding and agrees with the plan.     Warnings Override History for atorvastatin (LIPITOR) 40 MG tablet [[id number]]    Overridden by Kehr, Kristen M, PA-C on Jan 5, 2024 4:19 PM   Drug-Drug   1. GEMFIBROZIL / STATINS [Level: Major] [Reason: Tolerated medication/side effects in past]   Other Orders: gemfibrozil (LOPID) 600 MG tablet        CHRISTINE Rosales, RN

## 2024-04-18 NOTE — TELEPHONE ENCOUNTER
Diagnosis, Referred by & from: Hemorrhoids   Appt date: 7/8/2024   NOTES STATUS DETAILS   OFFICE NOTE from referring provider N/A    OFFICE NOTE from other specialist Care Everywhere / [facility] [place]:  11/2/23, 9/8/22 - PCC OV with Kristen Kehr, PA  4/28/21 - PCC OV with Dr. Virginie Adams:  10/24/23 - PCC OV with Dr. Stringer    [facility]:  9/12/19 - OBGYN OV with Dr. Addison   DISCHARGE SUMMARY from hospital N/A    DISCHARGE REPORT from the ER N/A    OPERATIVE REPORT N/A    MEDICATION LIST Internal    LABS     BIOPSIES/PATHOLOGY RELATED TO DIAGNOSIS Internal [facility]:  10/17/23 - Colon Biopsy (Case: [id number])  7/21/20 - Colon Biopsy (Case: [id number])   DIAGNOSTIC PROCEDURES     COLONOSCOPY (most recent all time after 5 years) [facility]:  10/17/23 - Colonoscopy  7/21/20 - Colonoscopy   IMAGING (DISC & REPORT) N/A

## 2024-07-08 ENCOUNTER — OFFICE VISIT (OUTPATIENT)
Dept: SURGERY | Facility: CLINIC | Age: 64
End: 2024-07-08
Payer: COMMERCIAL

## 2024-07-08 ENCOUNTER — PRE VISIT (OUTPATIENT)
Dept: SURGERY | Facility: CLINIC | Age: 64
End: 2024-07-08

## 2024-07-08 VITALS
OXYGEN SATURATION: 96 % | BODY MASS INDEX: 30.8 KG/M2 | SYSTOLIC BLOOD PRESSURE: 154 MMHG | HEART RATE: 77 BPM | WEIGHT: 180.4 LBS | DIASTOLIC BLOOD PRESSURE: 81 MMHG | HEIGHT: 64 IN

## 2024-07-08 DIAGNOSIS — K64.4 ANAL SKIN TAG: Primary | ICD-10-CM

## 2024-07-08 DIAGNOSIS — K62.89 RECTAL PAIN: ICD-10-CM

## 2024-07-08 DIAGNOSIS — R19.7 DIARRHEA, UNSPECIFIED TYPE: ICD-10-CM

## 2024-07-08 PROCEDURE — 46600 DIAGNOSTIC ANOSCOPY SPX: CPT

## 2024-07-08 PROCEDURE — 99203 OFFICE O/P NEW LOW 30 MIN: CPT | Mod: 25

## 2024-07-08 ASSESSMENT — PAIN SCALES - GENERAL: PAINLEVEL: NO PAIN (0)

## 2024-07-08 NOTE — NURSING NOTE
"Chief Complaint   Patient presents with    Rectal Problem       Vitals:    07/08/24 1554   BP: (!) 154/81   BP Location: Left arm   Patient Position: Sitting   Cuff Size: Adult Regular   Pulse: 77   SpO2: 96%   Weight: 180 lb 6.4 oz   Height: 5' 3.5\"       Body mass index is 31.46 kg/m .    Latasha Friedman, EMT  "

## 2024-07-08 NOTE — PROGRESS NOTES
"Colon and Rectal Surgery Consult Clinic Note    Referring provider:  Referred Self, MD  No address on file     Patient: Leeann Johnson  YOB: 1960  Date of Visit: 7/8/2024    Leeann Johnson is a very pleasant 63 year old female here with concerns for hemorrhoids.    Leeann reports onset of \"hemorrhoids\" last fall and she has since had two big flares. The biggest one was in October just before her colonoscopy. She describes her flares as significant rectal pain with bowel movements and as well as constantly. They will last for a few weeks. She will also notice some blood dripping and with wiping when this happens. In between flares, she will have a low level of pain but nothing severe. She also noticed a new external lump during this time and thinks that this is getting inflamed. At baseline she has 3 bowel movements per day. The first one is usually formed, then they become watery diarrhea. This has been her baseline for years. She notes that if she has more diarrhea than normal then she will have more flare ups of her rectal symptoms.     Last colonoscopy 10/2023 with 1 tubular adenoma. No family history of colorectal cancer.    Physical Examination:  BP (!) 154/81 (BP Location: Left arm, Patient Position: Sitting, Cuff Size: Adult Regular)   Pulse 77   Ht 5' 3.5\"   Wt 180 lb 6.4 oz   LMP  (LMP Unknown)   SpO2 96%   BMI 31.46 kg/m    General: alert, oriented, in no acute distress, sitting comfortably  Respiratory: non-labored breathing on RA  Chaperone: Latasha Friedman, EMT-B   Perianal External Examination: Mildly irritated perianal skin circumferentially. Soft skin tag left lateral position with slightly ulcerated appearance at its base, and this is tender.    Digital Rectal Examination: Was performed.   Sphincter tone: Good.  Palpable lesions: No.  Other: tender to palpation on the left side, no masses or induration  Bimanual examination: was not performed.    Anoscopy: Was performed to the left " lateral side. The base of the skin tag has a slightly irregular hypopigmented appearance which could be just mucosal erosion/ulcerations. The area is tender and not firm. Small internal hemorrhoid at the left lateral column. Did not visualize the rest of the anal canal.    Assessment/Plan: Leeann Johnson is a 63 year old female with rectal pain. She has perianal irritation and an anal skin tag on exam. The skin tag has an irregular appearance at its base which is slightly internal, and I recommended biopsy of this since her pain is located directly over this, and I would like to rule out dysplasia or neoplasm. She would like to hold on doing this until next month, since her insurance is changing to something with better coverage. This is reasonable. We will reschedule. In the meantime I recommended Calmoseptine and a daily fiber supplement. I also recommended a GI referral for her chronic diarrhea and she would like this. Patient's questions were answered to her stated satisfaction and she is in agreement with this plan.       Past Medical History:   Diagnosis Date    Hypertension      Past Surgical History:   Procedure Laterality Date    BUNIONECTOMY Left 2009     SECTION      COLONOSCOPY      COLONOSCOPY N/A 10/17/2023    Procedure: COLONOSCOPY, FLEXIBLE, WITH LESION REMOVAL USING SNARE;  Surgeon: Dennis Escobedo MD;  Location: [facility]    COLONOSCOPY WITH CO2 INSUFFLATION N/A 2020    Procedure: COLONOSCOPY, WITH CO2 INSUFFLATION;  Surgeon: Yimi Nettles MD;  Location: [facility]    COLONOSCOPY WITH CO2 INSUFFLATION N/A 10/17/2023    Procedure: Colonoscopy with CO2 insufflation;  Surgeon: Dennis Escobedo MD;  Location: [facility] OR    GYN SURGERY      Tubular    TUBAL LIGATION       Current Outpatient Medications   Medication Sig Dispense Refill    amLODIPine (NORVASC) 10 MG tablet TAKE 1 TABLET DAILY 90 tablet 0    aspirin (ASA) 81 MG tablet Take 1 tablet (81 mg) by mouth daily       atorvastatin (LIPITOR) 40 MG tablet TAKE 1 TABLET AT BEDTIME (LAST REFILL, PLEASE SCHEDULE APPOINTMENT AND FASTING LABWORK) 90 tablet 3    escitalopram (LEXAPRO) 20 MG tablet Take 1 tablet (20 mg) by mouth daily 90 tablet 2    gemfibrozil (LOPID) 600 MG tablet TAKE 1 TABLET TWICE A DAY BEFORE MEALS 180 tablet 3    lisinopril (ZESTRIL) 40 MG tablet Take 1 tablet (40 mg) by mouth daily 90 tablet 3    magnesium 250 MG tablet Take 1 tablet by mouth daily      Omega-3 Fatty Acids (FISH OIL OMEGA-3 PO)       omeprazole (PRILOSEC) 20 MG DR capsule TAKE 1 CAPSULE DAILY 90 capsule 3    vitamin B-Complex Take 1 tablet by mouth daily      VITAMIN D PO        No Known Allergies  Family History   Problem Relation Age of Onset    Diabetes Mother     Hypertension Mother     Hyperlipidemia Mother     Depression Mother     Coronary Artery Disease Father     Hypertension Father     Hyperlipidemia Father     Coronary Artery Disease Maternal Grandmother     Coronary Artery Disease Maternal Grandfather      Social History     Tobacco Use    Smoking status: Former     Current packs/day: 0.00     Average packs/day: 1 pack/day for 31.0 years (31.0 ttl pk-yrs)     Types: Cigarettes     Start date: 5/3/1974     Quit date: 5/3/2005     Years since quittin.1    Smokeless tobacco: Never   Substance Use Topics    Alcohol use: Yes     Comment: 3 drinks /day    Marital status: .    Lani Cagle PA-C  Colon and Rectal Surgery   [facility]      I spent a total of 30 minutes on the day of the visit.  Time spent on date of encounter doing chart review, history and exam, documentation, and further activities in this note.

## 2024-07-08 NOTE — LETTER
"7/8/2024       RE: Leeann Johnson  [address] [place] [id number]     Dear Colleague,    Thank you for referring your patient, Leeann Johnson, to the [facility] COLON AND RECTAL SURGERY CLINIC [place] at [facility]. Please see a copy of my visit note below.    Colon and Rectal Surgery Consult Clinic Note    Referring provider:  Referred Self, MD  No address on file     Patient: Leeann Johnson  YOB: 1960  Date of Visit: 7/8/2024    Leeann Johnson is a very pleasant 63 year old female here with concerns for hemorrhoids.    Leeann reports onset of \"hemorrhoids\" last fall and she has since had two big flares. The biggest one was in October just before her colonoscopy. She describes her flares as significant rectal pain with bowel movements and as well as constantly. They will last for a few weeks. She will also notice some blood dripping and with wiping when this happens. In between flares, she will have a low level of pain but nothing severe. She also noticed a new external lump during this time and thinks that this is getting inflamed. At baseline she has 3 bowel movements per day. The first one is usually formed, then they become watery diarrhea. This has been her baseline for years. She notes that if she has more diarrhea than normal then she will have more flare ups of her rectal symptoms.     Last colonoscopy 10/2023 with 1 tubular adenoma. No family history of colorectal cancer.    Physical Examination:  BP (!) 154/81 (BP Location: Left arm, Patient Position: Sitting, Cuff Size: Adult Regular)   Pulse 77   Ht 5' 3.5\"   Wt 180 lb 6.4 oz   LMP  (LMP Unknown)   SpO2 96%   BMI 31.46 kg/m    General: alert, oriented, in no acute distress, sitting comfortably  Respiratory: non-labored breathing on RA  Chaperone: Latasha Friedman EMT-B   Perianal External Examination: Mildly irritated perianal skin circumferentially. Soft skin tag " left lateral position with slightly ulcerated appearance at its base, and this is tender.    Digital Rectal Examination: Was performed.   Sphincter tone: Good.  Palpable lesions: No.  Other: tender to palpation on the left side, no masses or induration  Bimanual examination: was not performed.    Anoscopy: Was performed to the left lateral side. The base of the skin tag has a slightly irregular hypopigmented appearance which could be just mucosal erosion/ulcerations. The area is tender and not firm. Small internal hemorrhoid at the left lateral column. Did not visualize the rest of the anal canal.    Assessment/Plan: Leeann Johnson is a 63 year old female with rectal pain. She has perianal irritation and an anal skin tag on exam. The skin tag has an irregular appearance at its base which is slightly internal, and I recommended biopsy of this since her pain is located directly over this, and I would like to rule out dysplasia or neoplasm. She would like to hold on doing this until next month, since her insurance is changing to something with better coverage. This is reasonable. We will reschedule. In the meantime I recommended Calmoseptine and a daily fiber supplement. I also recommended a GI referral for her chronic diarrhea and she would like this. Patient's questions were answered to her stated satisfaction and she is in agreement with this plan.       Past Medical History:   Diagnosis Date     Hypertension 2018     Past Surgical History:   Procedure Laterality Date     BUNIONECTOMY Left 2009      SECTION       COLONOSCOPY  -     COLONOSCOPY N/A 10/17/2023    Procedure: COLONOSCOPY, FLEXIBLE, WITH LESION REMOVAL USING SNARE;  Surgeon: Dennis Escobedo MD;  Location: [facility]     COLONOSCOPY WITH CO2 INSUFFLATION N/A 2020    Procedure: COLONOSCOPY, WITH CO2 INSUFFLATION;  Surgeon: Yimi Nettles MD;  Location: [facility]     COLONOSCOPY WITH CO2 INSUFFLATION N/A 10/17/2023    Procedure:  Colonoscopy with CO2 insufflation;  Surgeon: Dennis Escobedo MD;  Location: [facility] OR     GYN SURGERY      Tubular     TUBAL LIGATION       Current Outpatient Medications   Medication Sig Dispense Refill     amLODIPine (NORVASC) 10 MG tablet TAKE 1 TABLET DAILY 90 tablet 0     aspirin (ASA) 81 MG tablet Take 1 tablet (81 mg) by mouth daily       atorvastatin (LIPITOR) 40 MG tablet TAKE 1 TABLET AT BEDTIME (LAST REFILL, PLEASE SCHEDULE APPOINTMENT AND FASTING LABWORK) 90 tablet 3     escitalopram (LEXAPRO) 20 MG tablet Take 1 tablet (20 mg) by mouth daily 90 tablet 2     gemfibrozil (LOPID) 600 MG tablet TAKE 1 TABLET TWICE A DAY BEFORE MEALS 180 tablet 3     lisinopril (ZESTRIL) 40 MG tablet Take 1 tablet (40 mg) by mouth daily 90 tablet 3     magnesium 250 MG tablet Take 1 tablet by mouth daily       Omega-3 Fatty Acids (FISH OIL OMEGA-3 PO)        omeprazole (PRILOSEC) 20 MG DR capsule TAKE 1 CAPSULE DAILY 90 capsule 3     vitamin B-Complex Take 1 tablet by mouth daily       VITAMIN D PO        No Known Allergies  Family History   Problem Relation Age of Onset     Diabetes Mother      Hypertension Mother      Hyperlipidemia Mother      Depression Mother      Coronary Artery Disease Father      Hypertension Father      Hyperlipidemia Father      Coronary Artery Disease Maternal Grandmother      Coronary Artery Disease Maternal Grandfather      Social History     Tobacco Use     Smoking status: Former     Current packs/day: 0.00     Average packs/day: 1 pack/day for 31.0 years (31.0 ttl pk-yrs)     Types: Cigarettes     Start date: 5/3/1974     Quit date: 5/3/2005     Years since quittin.1     Smokeless tobacco: Never   Substance Use Topics     Alcohol use: Yes     Comment: 3 drinks /day    Marital status: .    Lani Cagle PA-C  Colon and Rectal Surgery   [facility]      I spent a total of 30 minutes on the day of the visit.  Time spent on date of encounter doing chart  review, history and exam, documentation, and further activities in this note.

## 2024-08-30 ENCOUNTER — OFFICE VISIT (OUTPATIENT)
Dept: SURGERY | Facility: CLINIC | Age: 64
End: 2024-08-30
Payer: COMMERCIAL

## 2024-08-30 VITALS — OXYGEN SATURATION: 99 % | DIASTOLIC BLOOD PRESSURE: 75 MMHG | SYSTOLIC BLOOD PRESSURE: 143 MMHG | HEART RATE: 83 BPM

## 2024-08-30 DIAGNOSIS — K64.4 ANAL SKIN TAG: Primary | ICD-10-CM

## 2024-08-30 DIAGNOSIS — K62.9 PERIANAL LESION: ICD-10-CM

## 2024-08-30 PROCEDURE — 11102 TANGNTL BX SKIN SINGLE LES: CPT

## 2024-08-30 PROCEDURE — 88305 TISSUE EXAM BY PATHOLOGIST: CPT | Mod: TC

## 2024-08-30 PROCEDURE — 88305 TISSUE EXAM BY PATHOLOGIST: CPT | Mod: 26 | Performed by: STUDENT IN AN ORGANIZED HEALTH CARE EDUCATION/TRAINING PROGRAM

## 2024-08-30 ASSESSMENT — PAIN SCALES - GENERAL: PAINLEVEL: NO PAIN (0)

## 2024-08-30 NOTE — LETTER
8/30/2024       RE: Leeann Johnson  [address] [place] [id number]     Dear Colleague,    Thank you for referring your patient, Leeann Johnson, to the [facility] COLON AND RECTAL SURGERY CLINIC [place] at [facility]. Please see a copy of my visit note below.    Colon and Rectal Surgery Follow-Up Clinic Note    Patient: Leeann Johnson  YOB: 1960  Date of Visit: 8/30/2024    Leeann Johnson is a very pleasant 64 year old female here for biopsy of anal skin tag.    Interval history: I last saw Leeann on 7/8/24 for rectal pain and saw a left lateral anal skin tag with an irregular appearance at its base. This was also tender but not firm. I recommended a biopsy of the base of the skin tag which she was agreeable to, but she wanted to hold off until she switched insurance. She returns today for the biopsy.    She continues to have baseline discomfort with flares of pain and bleeding.     Physical Examination:   BP (!) 143/75 (BP Location: Left arm, Patient Position: Sitting, Cuff Size: Adult Regular)   Pulse 83   LMP  (LMP Unknown)   SpO2 99%   General: alert, oriented, in no acute distress, sitting comfortably  Respiratory: non-labored breathing on RA  Chaperone: Rufus Edwards, EMT-P   Perianal External Examination: Soft skin tag left lateral position with slightly ulcerated appearance at its base, and this is tender.     Digital Rectal Examination: Was deferred.    Anoscopy: Was performed only to left lateral aspect. The base of the skin tag has a slightly irregular hypopigmented appearance similar to last exam.    Procedure: Biopsy  After discussing the risks and benefits, the patient agreed to proceed with biopsy.    Prior to the start of the procedure and with procedural staff participation, I verbally confirmed the patient s identity using two indicators, relevant allergies, that the procedure was appropriate and matched the  consent or emergent situation, and that the correct equipment/implants were available. Immediately prior to starting the procedure I conducted the Time Out with the procedural staff and re-confirmed the patient s name, procedure, and site/side. (The Joint Commission universal protocol was followed.)  Yes    Sedation (Moderate or Deep): None    The skin was cleansed with povidone-iodine solution and injected with 1% lidocaine with epinephrine with good anesthetic effect. A biopsy of the base of the anal skin tag was obtained using a  Tischler biopsy forceps and sent for permanent pathology. There was minimal bleeding from the biopsy site and silver nitrate was applied. A dry 4x4 gauze dressing applied between the buttocks. Patient tolerated the procedure well.     Procedure was performed under collaborating agreement with Dr. Ashvin Pablo MD, Chief of the Division of Colon and Rectal Surgery    Assessment/Plan: 64 year old female with anal skin tag with irregular appearance at the base. I previously saw her and recommended a biopsy of this area and she returns today for this. See procedure note above. Advised that she hold baby aspirin for 2 weeks. I will reach out to her with results and next steps. Patient's questions were answered to her stated satisfaction and she is in agreement with this plan.       Past Medical History:   Diagnosis Date     Hypertension 2018     Past Surgical History:   Procedure Laterality Date     BUNIONECTOMY Left 2009      SECTION       COLONOSCOPY  -     COLONOSCOPY N/A 10/17/2023    Procedure: COLONOSCOPY, FLEXIBLE, WITH LESION REMOVAL USING SNARE;  Surgeon: Dennis Escobedo MD;  [facility]     COLONOSCOPY WITH CO2 INSUFFLATION N/A 2020    Procedure: COLONOSCOPY, WITH CO2 INSUFFLATION;  Surgeon: Yimi Nettles MD;  [facility]: [facility]     COLONOSCOPY WITH CO2 INSUFFLATION N/A 10/17/2023    Procedure: Colonoscopy with CO2 insufflation;  Surgeon: Fanny  Dennis HOBBS MD;  Location: [facility] OR     GYN SURGERY      Tubular     TUBAL LIGATION       Current Outpatient Medications   Medication Sig Dispense Refill     amLODIPine (NORVASC) 10 MG tablet TAKE 1 TABLET DAILY 90 tablet 0     aspirin (ASA) 81 MG tablet Take 1 tablet (81 mg) by mouth daily       atorvastatin (LIPITOR) 40 MG tablet TAKE 1 TABLET AT BEDTIME (LAST REFILL, PLEASE SCHEDULE APPOINTMENT AND FASTING LABWORK) 90 tablet 3     escitalopram (LEXAPRO) 20 MG tablet Take 1 tablet (20 mg) by mouth daily 90 tablet 2     gemfibrozil (LOPID) 600 MG tablet TAKE 1 TABLET TWICE A DAY BEFORE MEALS 180 tablet 3     lisinopril (ZESTRIL) 40 MG tablet Take 1 tablet (40 mg) by mouth daily 90 tablet 3     magnesium 250 MG tablet Take 1 tablet by mouth daily       Omega-3 Fatty Acids (FISH OIL OMEGA-3 PO)        omeprazole (PRILOSEC) 20 MG DR capsule TAKE 1 CAPSULE DAILY 90 capsule 3     vitamin B-Complex Take 1 tablet by mouth daily       VITAMIN D PO        No Known Allergies  Family History   Problem Relation Age of Onset     Diabetes Mother      Hypertension Mother      Hyperlipidemia Mother      Depression Mother      Coronary Artery Disease Father      Hypertension Father      Hyperlipidemia Father      Coronary Artery Disease Maternal Grandmother      Coronary Artery Disease Maternal Grandfather      Social History     Tobacco Use     Smoking status: Former     Current packs/day: 0.00     Average packs/day: 1 pack/day for 31.0 years (31.0 ttl pk-yrs)     Types: Cigarettes     Start date: 5/3/1974     Quit date: 5/3/2005     Years since quittin.3     Smokeless tobacco: Never   Substance Use Topics     Alcohol use: Yes     Comment: 3 drinks /day     Marital status: .      Lani Cagle PA-C  Colon and Rectal Surgery  [facility]      I spent a total of 10 minutes on the day of the visit.  Time spent on date of encounter doing chart review, history and exam, documentation, and  further activities in this note. An additional 10 minutes was spent for biopsy.       Again, thank you for allowing me to participate in the care of your patient.      Sincerely,    Lani Cagle PA-C

## 2024-08-30 NOTE — PROGRESS NOTES
Colon and Rectal Surgery Follow-Up Clinic Note    Patient: Leeann Johnson  YOB: 1960  Date of Visit: 8/30/2024    Leeann Johnson is a very pleasant 64 year old female here for biopsy of anal skin tag.    Interval history: I last saw Leeann on 7/8/24 for rectal pain and saw a left lateral anal skin tag with an irregular appearance at its base. This was also tender but not firm. I recommended a biopsy of the base of the skin tag which she was agreeable to, but she wanted to hold off until she switched insurance. She returns today for the biopsy.    She continues to have baseline discomfort with flares of pain and bleeding.     Physical Examination:   BP (!) 143/75 (BP Location: Left arm, Patient Position: Sitting, Cuff Size: Adult Regular)   Pulse 83   LMP  (LMP Unknown)   SpO2 99%   General: alert, oriented, in no acute distress, sitting comfortably  Respiratory: non-labored breathing on RA  Chaperone: Rufus Edwards, EMT-P   Perianal External Examination: Soft skin tag left lateral position with slightly ulcerated appearance at its base, and this is tender.     Digital Rectal Examination: Was deferred.    Anoscopy: Was performed only to left lateral aspect. The base of the skin tag has a slightly irregular hypopigmented appearance similar to last exam.    Procedure: Biopsy  After discussing the risks and benefits, the patient agreed to proceed with biopsy.    Prior to the start of the procedure and with procedural staff participation, I verbally confirmed the patient s identity using two indicators, relevant allergies, that the procedure was appropriate and matched the consent or emergent situation, and that the correct equipment/implants were available. Immediately prior to starting the procedure I conducted the Time Out with the procedural staff and re-confirmed the patient s name, procedure, and site/side. (The Joint Commission universal protocol was followed.)  Yes    Sedation (Moderate or  Deep): None    The skin was cleansed with povidone-iodine solution and injected with 1% lidocaine with epinephrine with good anesthetic effect. A biopsy of the base of the anal skin tag was obtained using a  Tischler biopsy forceps and sent for permanent pathology. There was minimal bleeding from the biopsy site and silver nitrate was applied. A dry 4x4 gauze dressing applied between the buttocks. Patient tolerated the procedure well.     Procedure was performed under collaborating agreement with Dr. Ashvin Pabol MD, Chief of the Division of Colon and Rectal Surgery    Assessment/Plan: 64 year old female with anal skin tag with irregular appearance at the base. I previously saw her and recommended a biopsy of this area and she returns today for this. See procedure note above. Advised that she hold baby aspirin for 2 weeks. I will reach out to her with results and next steps. Patient's questions were answered to her stated satisfaction and she is in agreement with this plan.       Past Medical History:   Diagnosis Date    Hypertension      Past Surgical History:   Procedure Laterality Date    BUNIONECTOMY Left 2009     SECTION      COLONOSCOPY      COLONOSCOPY N/A 10/17/2023    Procedure: COLONOSCOPY, FLEXIBLE, WITH LESION REMOVAL USING SNARE;  Surgeon: Dennis Escobedo MD;  Location: [facility]    COLONOSCOPY WITH CO2 INSUFFLATION N/A 2020    Procedure: COLONOSCOPY, WITH CO2 INSUFFLATION;  Surgeon: Yimi Nettles MD;  Location: [facility]    COLONOSCOPY WITH CO2 INSUFFLATION N/A 10/17/2023    Procedure: Colonoscopy with CO2 insufflation;  Surgeon: Dennis Escobedo MD;  Location: [facility] OR    GYN SURGERY      Tubular    TUBAL LIGATION       Current Outpatient Medications   Medication Sig Dispense Refill    amLODIPine (NORVASC) 10 MG tablet TAKE 1 TABLET DAILY 90 tablet 0    aspirin (ASA) 81 MG tablet Take 1 tablet (81 mg) by mouth daily      atorvastatin (LIPITOR) 40 MG tablet TAKE  1 TABLET AT BEDTIME (LAST REFILL, PLEASE SCHEDULE APPOINTMENT AND FASTING LABWORK) 90 tablet 3    escitalopram (LEXAPRO) 20 MG tablet Take 1 tablet (20 mg) by mouth daily 90 tablet 2    gemfibrozil (LOPID) 600 MG tablet TAKE 1 TABLET TWICE A DAY BEFORE MEALS 180 tablet 3    lisinopril (ZESTRIL) 40 MG tablet Take 1 tablet (40 mg) by mouth daily 90 tablet 3    magnesium 250 MG tablet Take 1 tablet by mouth daily      Omega-3 Fatty Acids (FISH OIL OMEGA-3 PO)       omeprazole (PRILOSEC) 20 MG DR capsule TAKE 1 CAPSULE DAILY 90 capsule 3    vitamin B-Complex Take 1 tablet by mouth daily      VITAMIN D PO        No Known Allergies  Family History   Problem Relation Age of Onset    Diabetes Mother     Hypertension Mother     Hyperlipidemia Mother     Depression Mother     Coronary Artery Disease Father     Hypertension Father     Hyperlipidemia Father     Coronary Artery Disease Maternal Grandmother     Coronary Artery Disease Maternal Grandfather      Social History     Tobacco Use    Smoking status: Former     Current packs/day: 0.00     Average packs/day: 1 pack/day for 31.0 years (31.0 ttl pk-yrs)     Types: Cigarettes     Start date: 5/3/1974     Quit date: 5/3/2005     Years since quittin.3    Smokeless tobacco: Never   Substance Use Topics    Alcohol use: Yes     Comment: 3 drinks /day     Marital status: .      Lani Cagle PA-C  Colon and Rectal Surgery  [facility]      I spent a total of 10 minutes on the day of the visit.  Time spent on date of encounter doing chart review, history and exam, documentation, and further activities in this note. An additional 10 minutes was spent for biopsy.

## 2024-08-30 NOTE — NURSING NOTE
Chief Complaint   Patient presents with    Follow Up       Vitals:    08/30/24 1003   BP: (!) 143/75   BP Location: Left arm   Patient Position: Sitting   Cuff Size: Adult Regular   Pulse: 83   SpO2: 99%       There is no height or weight on file to calculate BMI.    Rufus Edwards EMT-P

## 2024-09-04 NOTE — RESULT ENCOUNTER NOTE
Colon and Rectal Surgery Brief Note  09/04/24   Called and discussed results with Leeann, which returned benign. She started Metamucil in the meantime and this has significantly helped her bowel movements and also the rectal pain. I am not really inclined to offer surgery to excise the skin tag since pathology is benign and her symptoms seem to be controlled with conservative therapy. She agrees with this. Offered a follow up appointment in 6 weeks but she would prefer to reach out instead. Patient's questions were answered to her stated satisfaction and she is in agreement with this plan.     Lani Cagle PA-C     Time spent 5 minutes

## 2024-09-05 DIAGNOSIS — E78.00 HYPERCHOLESTEROLEMIA: ICD-10-CM

## 2024-09-05 RX ORDER — GEMFIBROZIL 600 MG/1
TABLET, FILM COATED ORAL
Qty: 180 TABLET | Refills: 1 | Status: SHIPPED | OUTPATIENT
Start: 2024-09-05

## 2024-10-03 ENCOUNTER — PATIENT OUTREACH (OUTPATIENT)
Dept: CARE COORDINATION | Facility: CLINIC | Age: 64
End: 2024-10-03
Payer: COMMERCIAL

## 2024-10-04 DIAGNOSIS — F41.9 ANXIETY: ICD-10-CM

## 2024-10-04 NOTE — LETTER
October 7, 2024    Leeann Johnson  [address] [place] [id number]    Dear Leeann,       We recently received a refill request for escitalopram (LEXAPRO) 20 MG tablet.  We have refilled this for a one time 90 day supply only because you are due for a:    Medication Follow Up office visit      Please call at your earliest convenience so that there will not be a delay with your future refills.          Thank you,   Your [facility] Team/[place]  [phone]

## 2024-10-07 RX ORDER — ESCITALOPRAM OXALATE 20 MG/1
20 TABLET ORAL DAILY
Qty: 90 TABLET | Refills: 0 | Status: SHIPPED | OUTPATIENT
Start: 2024-10-07

## 2024-10-07 NOTE — TELEPHONE ENCOUNTER
Chart and labs reviewed for length of stay. No nutrition intervention indicated at this time. RD available via consult. Will continue to follow per protocol.     Loretta Wright RD  Clinical Dietitian   Letter mailed.        Clemente Lane

## 2024-10-17 ENCOUNTER — PATIENT OUTREACH (OUTPATIENT)
Dept: CARE COORDINATION | Facility: CLINIC | Age: 64
End: 2024-10-17
Payer: COMMERCIAL

## 2025-01-04 ENCOUNTER — HEALTH MAINTENANCE LETTER (OUTPATIENT)
Age: 65
End: 2025-01-04

## 2025-02-25 ENCOUNTER — PATIENT OUTREACH (OUTPATIENT)
Dept: FAMILY MEDICINE | Facility: CLINIC | Age: 65
End: 2025-02-25
Payer: COMMERCIAL

## 2025-02-25 NOTE — TELEPHONE ENCOUNTER
Patient Quality Outreach    Patient is due for the following:   Breast Cancer Screening - Mammogram  Physical Annual Wellness Visit    Action(s) Taken:   Schedule a Adult Preventative    Type of outreach:    Sent Lightera message.    Questions for provider review:    None           Ja Tsang MA

## 2025-06-19 ENCOUNTER — TELEPHONE (OUTPATIENT)
Dept: FAMILY MEDICINE | Facility: CLINIC | Age: 65
End: 2025-06-19
Payer: COMMERCIAL

## 2025-06-19 NOTE — TELEPHONE ENCOUNTER
Patient Quality Outreach    Patient is due for the following:   Breast Cancer Screening - Mammogram    Action(s) Taken:   Need to schedule for a mammogram, annual wellness exam was done at [facility] on 10/18/25.      Type of outreach:    Sent vitalclip message.    Questions for provider review:    None         Ja Rivera MA  Chart routed to None.

## (undated) DEVICE — PREP CHLORAPREP 26ML TINTED ORANGE  260815

## (undated) DEVICE — SOL WATER IRRIG 1000ML BOTTLE 07139-09

## (undated) RX ORDER — FENTANYL CITRATE 50 UG/ML
INJECTION, SOLUTION INTRAMUSCULAR; INTRAVENOUS
Status: DISPENSED
Start: 2020-07-21

## (undated) RX ORDER — FENTANYL CITRATE 50 UG/ML
INJECTION, SOLUTION INTRAMUSCULAR; INTRAVENOUS
Status: DISPENSED
Start: 2023-10-17

## (undated) RX ORDER — LIDOCAINE HYDROCHLORIDE 20 MG/ML
JELLY TOPICAL
Status: DISPENSED
Start: 2023-10-17